# Patient Record
Sex: MALE | Race: WHITE | NOT HISPANIC OR LATINO | Employment: OTHER | URBAN - METROPOLITAN AREA
[De-identification: names, ages, dates, MRNs, and addresses within clinical notes are randomized per-mention and may not be internally consistent; named-entity substitution may affect disease eponyms.]

---

## 2018-12-31 ENCOUNTER — OFFICE VISIT (OUTPATIENT)
Dept: FAMILY MEDICINE CLINIC | Facility: CLINIC | Age: 58
End: 2018-12-31
Payer: COMMERCIAL

## 2018-12-31 VITALS
TEMPERATURE: 97.6 F | HEIGHT: 72 IN | DIASTOLIC BLOOD PRESSURE: 90 MMHG | HEART RATE: 72 BPM | OXYGEN SATURATION: 94 % | WEIGHT: 202.75 LBS | SYSTOLIC BLOOD PRESSURE: 142 MMHG | BODY MASS INDEX: 27.46 KG/M2

## 2018-12-31 DIAGNOSIS — E78.2 HYPERLIPIDEMIA, MIXED: ICD-10-CM

## 2018-12-31 DIAGNOSIS — Z23 NEED FOR TDAP VACCINATION: ICD-10-CM

## 2018-12-31 DIAGNOSIS — I10 ESSENTIAL HYPERTENSION: Primary | ICD-10-CM

## 2018-12-31 DIAGNOSIS — Z23 NEED FOR INFLUENZA VACCINATION: ICD-10-CM

## 2018-12-31 PROCEDURE — 99241 PR OFFICE CONSULTATION NEW/ESTAB PATIENT 15 MIN: CPT | Performed by: FAMILY MEDICINE

## 2018-12-31 RX ORDER — AMLODIPINE BESYLATE 10 MG/1
10 TABLET ORAL DAILY
Refills: 3 | COMMUNITY
Start: 2018-12-14

## 2018-12-31 RX ORDER — OMEGA-3-ACID ETHYL ESTERS 1 G/1
2 CAPSULE, LIQUID FILLED ORAL 2 TIMES DAILY
COMMUNITY
End: 2019-06-26 | Stop reason: CLARIF

## 2018-12-31 RX ORDER — CURCUMIN 100 %
POWDER (GRAM) MISCELLANEOUS
COMMUNITY

## 2018-12-31 NOTE — ASSESSMENT & PLAN NOTE
We discussed that his LDL goal is less than 130  It is certainly helpful that his HDL is high  He also asked about testing for particle size of LDL  This is definitely something we can do if he would like to-after discussion we decided we will do this will recheck his cholesterol next year  I also encouraged that he discuss this all with his cardiologist when he sees him in May

## 2018-12-31 NOTE — PROGRESS NOTES
Subjective:   Chief Complaint   Patient presents with    New Patient     pt is here today to become a new patient and establish care  Patient ID: Estela Ritter is a 62 y o  male  The pt is here to establish care   He has HTN  He is on amlodipine without any side effects  His hypertension has been very well controlled  120s/80s at home  130s-140s/90s in the doctors office  Sees cardiology - Dr Laura Quiros at [facility] annually    He was diagnosed with high cholesterol over the past couple of years  He has been taking plant sterols to try and bring it down  He is adamant that he would like to avoid statins  He has high HDL and high LDL  , HDL 67  The year before the LDL was 130  He thinks his mom has high cholesterol, his dad does not    He has a very healthy diet, lots of fish and chicken  Really fastidious about diet and exercise    No cp/sob/headaches        The following portions of the patient's history were reviewed and updated as appropriate: allergies, current medications, past family history, past medical history, past social history, past surgical history and problem list     Review of Systems   Constitutional: Negative for unexpected weight change  Eyes: Negative for visual disturbance  Respiratory: Negative for shortness of breath  Cardiovascular: Negative for chest pain  Gastrointestinal: Negative for abdominal pain  Genitourinary: Negative for difficulty urinating  Musculoskeletal: Negative for gait problem  Skin: Negative for rash  Allergic/Immunologic: Negative for immunocompromised state  Neurological: Negative for headaches  Objective:  Vitals:    12/31/18 1208   BP: 142/90   Pulse: 72   Temp: 97 6 °F (36 4 °C)   SpO2: 94%   Weight: 92 kg (202 lb 12 oz)   Height: 6' (1 829 m)      Physical Exam   Constitutional: He is oriented to person, place, and time  He appears well-developed and well-nourished  HENT:   Head: Normocephalic and atraumatic  Right Ear: External ear normal    Left Ear: External ear normal    Nose: Nose normal    Mouth/Throat: Oropharynx is clear and moist    Eyes: Pupils are equal, round, and reactive to light  Conjunctivae and EOM are normal    Neck: Normal range of motion  Neck supple  Cardiovascular: Normal rate, regular rhythm and normal heart sounds  Pulmonary/Chest: Effort normal and breath sounds normal    Musculoskeletal: Normal range of motion  He exhibits no edema  Neurological: He is alert and oriented to person, place, and time  Skin: Skin is warm, dry and intact  Psychiatric: He has a normal mood and affect  His behavior is normal  Judgment and thought content normal    Nursing note and vitals reviewed  Assessment/Plan:    Essential hypertension  Well controlled, continue amlodipine, continue routine follow-up with the cardiologist     Hyperlipidemia, mixed  We discussed that his LDL goal is less than 130  It is certainly helpful that his HDL is high  He also asked about testing for particle size of LDL  This is definitely something we can do if he would like to-after discussion we decided we will do this will recheck his cholesterol next year  I also encouraged that he discuss this all with his cardiologist when he sees him in May         Diagnoses and all orders for this visit:    Essential hypertension    Hyperlipidemia, mixed

## 2019-05-02 ENCOUNTER — TELEPHONE (OUTPATIENT)
Dept: FAMILY MEDICINE CLINIC | Facility: CLINIC | Age: 59
End: 2019-05-02

## 2019-05-02 DIAGNOSIS — E78.2 HYPERLIPIDEMIA, MIXED: ICD-10-CM

## 2019-05-02 DIAGNOSIS — Z11.59 ENCOUNTER FOR HEPATITIS C SCREENING TEST FOR LOW RISK PATIENT: ICD-10-CM

## 2019-05-02 DIAGNOSIS — I10 ESSENTIAL HYPERTENSION: Primary | ICD-10-CM

## 2019-05-02 DIAGNOSIS — Z12.5 SCREENING PSA (PROSTATE SPECIFIC ANTIGEN): ICD-10-CM

## 2019-06-20 ENCOUNTER — CLINICAL SUPPORT (OUTPATIENT)
Dept: FAMILY MEDICINE CLINIC | Facility: CLINIC | Age: 59
End: 2019-06-20
Payer: COMMERCIAL

## 2019-06-20 DIAGNOSIS — I10 ESSENTIAL HYPERTENSION: ICD-10-CM

## 2019-06-20 DIAGNOSIS — Z11.59 ENCOUNTER FOR HEPATITIS C SCREENING TEST FOR LOW RISK PATIENT: ICD-10-CM

## 2019-06-20 DIAGNOSIS — Z12.5 SCREENING PSA (PROSTATE SPECIFIC ANTIGEN): ICD-10-CM

## 2019-06-20 DIAGNOSIS — E78.2 HYPERLIPIDEMIA, MIXED: Primary | ICD-10-CM

## 2019-06-20 PROCEDURE — 36415 COLL VENOUS BLD VENIPUNCTURE: CPT

## 2019-06-21 LAB
ALBUMIN SERPL-MCNC: 4.4 G/DL (ref 3.5–5.5)
ALBUMIN/GLOB SERPL: 2.1 {RATIO} (ref 1.2–2.2)
ALP SERPL-CCNC: 66 IU/L (ref 39–117)
ALT SERPL-CCNC: 23 IU/L (ref 0–44)
AST SERPL-CCNC: 25 IU/L (ref 0–40)
BASOPHILS # BLD AUTO: 0 X10E3/UL (ref 0–0.2)
BASOPHILS NFR BLD AUTO: 1 %
BILIRUB SERPL-MCNC: 0.6 MG/DL (ref 0–1.2)
BUN SERPL-MCNC: 16 MG/DL (ref 6–24)
BUN/CREAT SERPL: 19 (ref 9–20)
CALCIUM SERPL-MCNC: 9.4 MG/DL (ref 8.7–10.2)
CHLORIDE SERPL-SCNC: 103 MMOL/L (ref 96–106)
CHOLEST SERPL-MCNC: 220 MG/DL (ref 100–199)
CO2 SERPL-SCNC: 28 MMOL/L (ref 20–29)
CREAT SERPL-MCNC: 0.83 MG/DL (ref 0.76–1.27)
EOSINOPHIL # BLD AUTO: 0.2 X10E3/UL (ref 0–0.4)
EOSINOPHIL NFR BLD AUTO: 4 %
ERYTHROCYTE [DISTWIDTH] IN BLOOD BY AUTOMATED COUNT: 13.5 % (ref 12.3–15.4)
GLOBULIN SER-MCNC: 2.1 G/DL (ref 1.5–4.5)
GLUCOSE SERPL-MCNC: 93 MG/DL (ref 65–99)
HCT VFR BLD AUTO: 47.1 % (ref 37.5–51)
HCV AB S/CO SERPL IA: <0.1 S/CO RATIO (ref 0–0.9)
HDLC SERPL-MCNC: 64 MG/DL
HGB BLD-MCNC: 16.2 G/DL (ref 13–17.7)
IMM GRANULOCYTES # BLD: 0 X10E3/UL (ref 0–0.1)
IMM GRANULOCYTES NFR BLD: 0 %
LDLC SERPL CALC-MCNC: 141 MG/DL (ref 0–99)
LDLC/HDLC SERPL: 2.2 RATIO (ref 0–3.6)
LYMPHOCYTES # BLD AUTO: 1.8 X10E3/UL (ref 0.7–3.1)
LYMPHOCYTES NFR BLD AUTO: 32 %
MCH RBC QN AUTO: 30.9 PG (ref 26.6–33)
MCHC RBC AUTO-ENTMCNC: 34.4 G/DL (ref 31.5–35.7)
MCV RBC AUTO: 90 FL (ref 79–97)
MONOCYTES # BLD AUTO: 0.5 X10E3/UL (ref 0.1–0.9)
MONOCYTES NFR BLD AUTO: 9 %
NEUTROPHILS # BLD AUTO: 3.2 X10E3/UL (ref 1.4–7)
NEUTROPHILS NFR BLD AUTO: 54 %
PLATELET # BLD AUTO: 191 X10E3/UL (ref 150–450)
POTASSIUM SERPL-SCNC: 4.6 MMOL/L (ref 3.5–5.2)
PROT SERPL-MCNC: 6.5 G/DL (ref 6–8.5)
PSA SERPL-MCNC: 0.7 NG/ML (ref 0–4)
RBC # BLD AUTO: 5.24 X10E6/UL (ref 4.14–5.8)
SL AMB EGFR AFRICAN AMERICAN: 112 ML/MIN/1.73
SL AMB EGFR NON AFRICAN AMERICAN: 97 ML/MIN/1.73
SL AMB REFLEX CRITERIA: NORMAL
SL AMB VLDL CHOLESTEROL CALC: 15 MG/DL (ref 5–40)
SODIUM SERPL-SCNC: 143 MMOL/L (ref 134–144)
TRIGL SERPL-MCNC: 76 MG/DL (ref 0–149)
TSH SERPL DL<=0.005 MIU/L-ACNC: 3.01 UIU/ML (ref 0.45–4.5)
WBC # BLD AUTO: 5.7 X10E3/UL (ref 3.4–10.8)

## 2019-06-26 ENCOUNTER — OFFICE VISIT (OUTPATIENT)
Dept: FAMILY MEDICINE CLINIC | Facility: CLINIC | Age: 59
End: 2019-06-26
Payer: COMMERCIAL

## 2019-06-26 VITALS
WEIGHT: 193 LBS | HEIGHT: 72 IN | DIASTOLIC BLOOD PRESSURE: 82 MMHG | HEART RATE: 61 BPM | OXYGEN SATURATION: 98 % | SYSTOLIC BLOOD PRESSURE: 134 MMHG | BODY MASS INDEX: 26.14 KG/M2 | TEMPERATURE: 97.2 F

## 2019-06-26 DIAGNOSIS — E78.2 HYPERLIPIDEMIA, MIXED: ICD-10-CM

## 2019-06-26 DIAGNOSIS — Z00.00 ANNUAL PHYSICAL EXAM: Primary | ICD-10-CM

## 2019-06-26 DIAGNOSIS — I10 ESSENTIAL HYPERTENSION: ICD-10-CM

## 2019-06-26 PROCEDURE — 99396 PREV VISIT EST AGE 40-64: CPT | Performed by: FAMILY MEDICINE

## 2019-09-03 ENCOUNTER — OFFICE VISIT (OUTPATIENT)
Dept: FAMILY MEDICINE CLINIC | Facility: CLINIC | Age: 59
End: 2019-09-03
Payer: COMMERCIAL

## 2019-09-03 VITALS
OXYGEN SATURATION: 98 % | SYSTOLIC BLOOD PRESSURE: 138 MMHG | HEIGHT: 72 IN | HEART RATE: 66 BPM | TEMPERATURE: 98.3 F | WEIGHT: 199 LBS | DIASTOLIC BLOOD PRESSURE: 98 MMHG | BODY MASS INDEX: 26.95 KG/M2

## 2019-09-03 DIAGNOSIS — R07.89 DISCOMFORT IN CHEST: Primary | ICD-10-CM

## 2019-09-03 DIAGNOSIS — K21.9 GASTROESOPHAGEAL REFLUX DISEASE WITHOUT ESOPHAGITIS: ICD-10-CM

## 2019-09-03 PROCEDURE — 3008F BODY MASS INDEX DOCD: CPT | Performed by: FAMILY MEDICINE

## 2019-09-03 PROCEDURE — 99214 OFFICE O/P EST MOD 30 MIN: CPT | Performed by: FAMILY MEDICINE

## 2019-09-03 RX ORDER — RANITIDINE 150 MG/1
150 TABLET ORAL
Qty: 30 TABLET | Refills: 0 | Status: SHIPPED | OUTPATIENT
Start: 2019-09-03 | End: 2022-02-23

## 2019-09-03 NOTE — PATIENT INSTRUCTIONS
Gastroesophageal Reflux Disease   WHAT YOU NEED TO KNOW:   Gastroesophageal reflux occurs when acid and food in the stomach back up into the esophagus  Gastroesophageal reflux disease (GERD) is reflux that occurs more than twice a week for a few weeks  It usually causes heartburn and other symptoms  GERD can cause other health problems over time if it is not treated  DISCHARGE INSTRUCTIONS:   Return to the emergency department if:   · You feel full and cannot burp or vomit  · You have severe chest pain and sudden trouble breathing  · Your bowel movements are black, bloody, or tarry-looking  · Your vomit looks like coffee grounds or has blood in it  Contact your healthcare provider if:   · You vomit large amounts, or you vomit often  · You have trouble breathing after you vomit  · You have trouble swallowing, or pain with swallowing  · You are losing weight without trying  · Your symptoms get worse or do not improve with treatment  · You have questions or concerns about your condition or care  Medicines:   · Medicines  are used to decrease stomach acid  Medicine may also be used to help your lower esophageal sphincter and stomach contract (tighten) more  · Take your medicine as directed  Contact your healthcare provider if you think your medicine is not helping or if you have side effects  Tell him of her if you are allergic to any medicine  Keep a list of the medicines, vitamins, and herbs you take  Include the amounts, and when and why you take them  Bring the list or the pill bottles to follow-up visits  Carry your medicine list with you in case of an emergency  Manage GERD:   · Do not have foods or drinks that may increase heartburn  These include chocolate, peppermint, fried or fatty foods, drinks that contain caffeine, or carbonated drinks (soda)  Other foods include spicy foods, onions, tomatoes, and tomato-based foods   Do not have foods or drinks that can irritate your esophagus, such as citrus fruits, juices, and alcohol  · Do not eat large meals  When you eat a lot of food at one time, your stomach needs more acid to digest it  Eat 6 small meals each day instead of 3 large ones, and eat slowly  Do not eat meals 2 to 3 hours before bedtime  · Elevate the head of your bed  Place 6-inch blocks under the head of your bed frame  You may also use more than one pillow under your head and shoulders while you sleep  · Maintain a healthy weight  If you are overweight, weight loss may help relieve symptoms of GERD  · Do not smoke  Smoking weakens the lower esophageal sphincter and increases the risk of GERD  Ask your healthcare provider for information if you currently smoke and need help to quit  E-cigarettes or smokeless tobacco still contain nicotine  Talk to your healthcare provider before you use these products  · Do not wear clothing that is tight around your waist   Tight clothing can put pressure on your stomach and cause or worsen GERD symptoms  Follow up with your healthcare provider as directed:  Write down your questions so you remember to ask them during your visits  © 2017 [facility] Information is for End User's use only and may not be sold, redistributed or otherwise used for commercial purposes  All illustrations and images included in CareNotes® are the copyrighted property of [employer]  or Adriel Quiroz  The above information is an  only  It is not intended as medical advice for individual conditions or treatments  Talk to your doctor, nurse or pharmacist before following any medical regimen to see if it is safe and effective for you

## 2019-09-03 NOTE — PROGRESS NOTES
Subjective:   Chief Complaint   Patient presents with   Choco Serraon     pt comes in today for episodes of weakness and tingling in the middle of the night   pt states it wakes him from sleep starting at the top of his head down to his toes  pt has had 3 episodes, which only the first did he vomit  pt does say he has gerd sx's  pt also has hx of lumbar herniations  stress test in july negative  Patient ID: Marilu Valdez is a 62 y o  male  First episode was early July   He woke up and had to get out of bed  He walked around, had a normal BM, then he vomited  "Weak, tingling feeling" top of head all the way down to his toes  This has happened 2 other times - mid August, and last Friday night  He did not vomit the next 2 times  Happens around 1-2 am - wakes him from sleep  Starts with a reflux type pain in the center of his chest  He checks his BP at these times and it is high but he thinks it is because of the feeling he is having this episode - 160/100  He just had a completely normal stress test at the end of July   Went back to bed each time  The next day he felt completely fine like nothing ever happened  2/3 episodes after pizza on a Friday night               The following portions of the patient's history were reviewed and updated as appropriate: allergies, current medications, past family history, past medical history, past social history, past surgical history and problem list     Review of Systems      Objective:  Vitals:    09/03/19 1618   BP: 138/98   Pulse: 66   Temp: 98 3 °F (36 8 °C)   SpO2: 98%   Weight: 90 3 kg (199 lb)   Height: 6' (1 829 m)      Physical Exam   Constitutional: He is oriented to person, place, and time  He appears well-developed and well-nourished  No distress  Neurological: He is alert and oriented to person, place, and time  No cranial nerve deficit  Coordination normal    Skin: Skin is warm and dry  No rash noted  He is not diaphoretic  No erythema  Psychiatric: He has a normal mood and affect  His behavior is normal  Judgment and thought content normal          Assessment/Plan:    No problem-specific Assessment & Plan notes found for this encounter  Diagnoses and all orders for this visit:    Discomfort in chest    Gastroesophageal reflux disease without esophagitis  -     ranitidine (ZANTAC) 150 mg tablet; Take 1 tablet (150 mg total) by mouth daily at bedtime        We reviewed the patient's recent labs as well as his completely normal stress test at the end of July  I am relatively comfortable that this is not cardiac in origin and am feeling more certain that this is related to reflux  I am going to try him on ranitidine nightly to see if this prevents these exacerbations  If he is going to eat something that would exacerbate reflux, and we did discuss these foods, he can take a Zantac at a time to try and prevent that  If he is still getting exacerbations through the Zantac that I would have him see his gastroenterologist in [facility] to consider upper endoscopy  More than half of this 25 minute visit spent counseling and coordinating care

## 2019-10-02 ENCOUNTER — TELEPHONE (OUTPATIENT)
Dept: FAMILY MEDICINE CLINIC | Facility: CLINIC | Age: 59
End: 2019-10-02

## 2019-10-02 NOTE — TELEPHONE ENCOUNTER
Please call the patient and see if he has checked his blood pressure outside of the office  When he was here a month ago his blood pressure was elevated  He also was not feeling well  Please see if he is feeling better and also see if his blood pressure has come down  If he is not sure please schedule him a nurse visit to have his blood pressure checked

## 2019-10-08 ENCOUNTER — DOCUMENTATION (OUTPATIENT)
Dept: FAMILY MEDICINE CLINIC | Facility: CLINIC | Age: 59
End: 2019-10-08

## 2019-10-08 VITALS — SYSTOLIC BLOOD PRESSURE: 128 MMHG | DIASTOLIC BLOOD PRESSURE: 82 MMHG

## 2020-07-27 ENCOUNTER — TELEPHONE (OUTPATIENT)
Dept: FAMILY MEDICINE CLINIC | Facility: CLINIC | Age: 60
End: 2020-07-27

## 2020-07-30 DIAGNOSIS — Z13.21 SCREENING FOR ENDOCRINE, NUTRITIONAL, METABOLIC AND IMMUNITY DISORDER: ICD-10-CM

## 2020-07-30 DIAGNOSIS — Z13.228 SCREENING FOR ENDOCRINE, NUTRITIONAL, METABOLIC AND IMMUNITY DISORDER: ICD-10-CM

## 2020-07-30 DIAGNOSIS — E78.2 HYPERLIPIDEMIA, MIXED: Primary | ICD-10-CM

## 2020-07-30 DIAGNOSIS — Z13.0 SCREENING FOR IRON DEFICIENCY ANEMIA: ICD-10-CM

## 2020-07-30 DIAGNOSIS — Z12.5 SCREENING FOR MALIGNANT NEOPLASM OF PROSTATE: ICD-10-CM

## 2020-07-30 DIAGNOSIS — Z13.29 SCREENING FOR ENDOCRINE, NUTRITIONAL, METABOLIC AND IMMUNITY DISORDER: ICD-10-CM

## 2020-07-30 DIAGNOSIS — Z13.0 SCREENING FOR ENDOCRINE, NUTRITIONAL, METABOLIC AND IMMUNITY DISORDER: ICD-10-CM

## 2020-07-30 DIAGNOSIS — Z13.29 SCREENING FOR THYROID DISORDER: ICD-10-CM

## 2020-08-06 LAB
ALBUMIN SERPL-MCNC: 4.6 G/DL (ref 3.8–4.9)
ALBUMIN/GLOB SERPL: 2 {RATIO} (ref 1.2–2.2)
ALP SERPL-CCNC: 74 IU/L (ref 39–117)
ALT SERPL-CCNC: 27 IU/L (ref 0–44)
AST SERPL-CCNC: 34 IU/L (ref 0–40)
BASOPHILS # BLD AUTO: 0.1 X10E3/UL (ref 0–0.2)
BASOPHILS NFR BLD AUTO: 1 %
BILIRUB SERPL-MCNC: 0.7 MG/DL (ref 0–1.2)
BUN SERPL-MCNC: 20 MG/DL (ref 6–24)
BUN/CREAT SERPL: 20 (ref 9–20)
CALCIUM SERPL-MCNC: 9.8 MG/DL (ref 8.7–10.2)
CHLORIDE SERPL-SCNC: 102 MMOL/L (ref 96–106)
CHOLEST SERPL-MCNC: 216 MG/DL (ref 100–199)
CO2 SERPL-SCNC: 26 MMOL/L (ref 20–29)
CREAT SERPL-MCNC: 1.01 MG/DL (ref 0.76–1.27)
EOSINOPHIL # BLD AUTO: 0.2 X10E3/UL (ref 0–0.4)
EOSINOPHIL NFR BLD AUTO: 4 %
ERYTHROCYTE [DISTWIDTH] IN BLOOD BY AUTOMATED COUNT: 12.6 % (ref 11.6–15.4)
GLOBULIN SER-MCNC: 2.3 G/DL (ref 1.5–4.5)
GLUCOSE SERPL-MCNC: 94 MG/DL (ref 65–99)
HCT VFR BLD AUTO: 48.3 % (ref 37.5–51)
HDLC SERPL-MCNC: 71 MG/DL
HGB BLD-MCNC: 16.1 G/DL (ref 13–17.7)
IMM GRANULOCYTES # BLD: 0 X10E3/UL (ref 0–0.1)
IMM GRANULOCYTES NFR BLD: 0 %
LDLC SERPL CALC-MCNC: 131 MG/DL (ref 0–99)
LDLC/HDLC SERPL: 1.8 RATIO (ref 0–3.6)
LYMPHOCYTES # BLD AUTO: 1.7 X10E3/UL (ref 0.7–3.1)
LYMPHOCYTES NFR BLD AUTO: 32 %
MCH RBC QN AUTO: 29.4 PG (ref 26.6–33)
MCHC RBC AUTO-ENTMCNC: 33.3 G/DL (ref 31.5–35.7)
MCV RBC AUTO: 88 FL (ref 79–97)
MONOCYTES # BLD AUTO: 0.5 X10E3/UL (ref 0.1–0.9)
MONOCYTES NFR BLD AUTO: 9 %
NEUTROPHILS # BLD AUTO: 2.9 X10E3/UL (ref 1.4–7)
NEUTROPHILS NFR BLD AUTO: 54 %
PLATELET # BLD AUTO: 204 X10E3/UL (ref 150–450)
POTASSIUM SERPL-SCNC: 5.3 MMOL/L (ref 3.5–5.2)
PROT SERPL-MCNC: 6.9 G/DL (ref 6–8.5)
PSA SERPL-MCNC: 0.7 NG/ML (ref 0–4)
RBC # BLD AUTO: 5.47 X10E6/UL (ref 4.14–5.8)
SL AMB EGFR AFRICAN AMERICAN: 94 ML/MIN/1.73
SL AMB EGFR NON AFRICAN AMERICAN: 81 ML/MIN/1.73
SL AMB REFLEX CRITERIA: NORMAL
SL AMB VLDL CHOLESTEROL CALC: 14 MG/DL (ref 5–40)
SODIUM SERPL-SCNC: 139 MMOL/L (ref 134–144)
TRIGL SERPL-MCNC: 68 MG/DL (ref 0–149)
TSH SERPL DL<=0.005 MIU/L-ACNC: 3.2 UIU/ML (ref 0.45–4.5)
WBC # BLD AUTO: 5.3 X10E3/UL (ref 3.4–10.8)

## 2020-08-17 ENCOUNTER — OFFICE VISIT (OUTPATIENT)
Dept: FAMILY MEDICINE CLINIC | Facility: CLINIC | Age: 60
End: 2020-08-17
Payer: COMMERCIAL

## 2020-08-17 VITALS
TEMPERATURE: 98.4 F | OXYGEN SATURATION: 99 % | BODY MASS INDEX: 27.29 KG/M2 | SYSTOLIC BLOOD PRESSURE: 128 MMHG | HEIGHT: 72 IN | DIASTOLIC BLOOD PRESSURE: 80 MMHG | HEART RATE: 62 BPM | WEIGHT: 201.5 LBS

## 2020-08-17 DIAGNOSIS — Z00.00 ANNUAL PHYSICAL EXAM: Primary | ICD-10-CM

## 2020-08-17 DIAGNOSIS — Z23 NEED FOR VACCINATION: ICD-10-CM

## 2020-08-17 PROBLEM — I10 WHITE COAT SYNDROME WITH DIAGNOSIS OF HYPERTENSION: Status: ACTIVE | Noted: 2020-08-17

## 2020-08-17 PROCEDURE — 99396 PREV VISIT EST AGE 40-64: CPT | Performed by: FAMILY MEDICINE

## 2020-08-17 PROCEDURE — 3074F SYST BP LT 130 MM HG: CPT | Performed by: FAMILY MEDICINE

## 2020-08-17 PROCEDURE — 1036F TOBACCO NON-USER: CPT | Performed by: FAMILY MEDICINE

## 2020-08-17 PROCEDURE — 3008F BODY MASS INDEX DOCD: CPT | Performed by: FAMILY MEDICINE

## 2020-08-17 PROCEDURE — 3079F DIAST BP 80-89 MM HG: CPT | Performed by: FAMILY MEDICINE

## 2020-08-17 PROCEDURE — 3725F SCREEN DEPRESSION PERFORMED: CPT | Performed by: FAMILY MEDICINE

## 2020-08-17 PROCEDURE — 90715 TDAP VACCINE 7 YRS/> IM: CPT

## 2020-08-17 PROCEDURE — 90471 IMMUNIZATION ADMIN: CPT

## 2020-08-17 RX ORDER — ASPIRIN 81 MG/1
TABLET ORAL
COMMUNITY

## 2020-08-17 NOTE — PROGRESS NOTES
[facility]    NAME: Ela Carbone  AGE: 61 y o  SEX: male  : 1960     DATE: 2020     Assessment and Plan:     Problem List Items Addressed This Visit     None      Visit Diagnoses     Annual physical exam    -  Primary    Need for vaccination        Relevant Orders    TDAP VACCINE GREATER THAN OR EQUAL TO 6YO IM (Completed)          Immunizations and preventive care screenings were discussed with patient today  Appropriate education was printed on patient's after visit summary  Counseling:  Dental Health: discussed importance of regular tooth brushing, flossing, and dental visits  · Exercise: the importance of regular exercise/physical activity was discussed  Recommend exercise 3-5 times per week for at least 30 minutes  · Labs reviewed, generally within normal limits, LDL of 131 which is decreased from 141 last year  His ASCVD score was initially 9 5% with his initial systolic blood pressure of 140 but came down to 7 7% with my repeat blood pressure  At this point we decided together to avoid starting statins, his HDL is 71 which is excellent  Certainly bringing the LDL down would decrease his ASCVD risk score but he is extremely active in needs an extremely clean diet  We will follow the lipids carefully and if they do start to trend upward he would be amenable to starting a small dose of a statin if needed  BMI Counseling: Body mass index is 27 33 kg/m²  The BMI is above normal  Exercise recommendations include exercising 3-5 times per week  No follow-ups on file  Chief Complaint:     Chief Complaint   Patient presents with    Annual Exam     Pt here today for his annual exam  [address] 2020  History of Present Illness:     Adult Annual Physical   Patient here for a comprehensive physical exam  The patient reports no problems      Diet and Physical Activity  · Diet/Nutrition: well balanced diet    · Exercise: moderate cardiovascular exercise  Depression Screening  PHQ-9 Depression Screening    PHQ-9:    Frequency of the following problems over the past two weeks:       Little interest or pleasure in doing things:  0 - not at all  Feeling down, depressed, or hopeless:  0 - not at all  PHQ-2 Score:  0       General Health  · Sleep: sleeps well  · Hearing: normal - bilateral   · Vision: no vision problems and most recent eye exam <1 year ago  · Dental: regular dental visits  Review of Systems:     Review of Systems   Constitutional: Negative for chills, fever and unexpected weight change  HENT: Negative for congestion, ear pain, hearing loss, postnasal drip, rhinorrhea and sore throat  Eyes: Negative for visual disturbance  Respiratory: Negative for cough and shortness of breath  Cardiovascular: Negative for chest pain  Gastrointestinal: Negative for abdominal pain, constipation and diarrhea  Genitourinary: Negative for difficulty urinating  Musculoskeletal: Negative for arthralgias and gait problem  Skin: Negative for rash  Neurological: Negative for light-headedness and headaches  Psychiatric/Behavioral: Negative for dysphoric mood and sleep disturbance  The patient is not nervous/anxious         Past Medical History:     Past Medical History:   Diagnosis Date    HL (hearing loss)     rining of ears    Hypertension     Shingles       Past Surgical History:     Past Surgical History:   Procedure Laterality Date    VENTRAL HERNIA REPAIR  2001      Family History:     Family History   Problem Relation Age of Onset    Cancer Mother     Hypertension Father     Prostate cancer Father     Heart attack Father     Autism Son       Social History:        Social History     Socioeconomic History    Marital status: /Civil Union     Spouse name: None    Number of children: None    Years of education: None    Highest education level: None   Occupational History    None   Social Needs    Financial resource strain: None    Food insecurity     Worry: None     Inability: None    Transportation needs     Medical: None     Non-medical: None   Tobacco Use    Smoking status: Former Smoker    Smokeless tobacco: Former User   Substance and Sexual Activity    Alcohol use: Yes     Comment: 16 oz per week     Drug use: Yes     Comment: 30 years ago     Sexual activity: None   Lifestyle    Physical activity     Days per week: None     Minutes per session: None    Stress: None   Relationships    Social connections     Talks on phone: None     Gets together: None     Attends [employer] service: None     Active member of club or organization: None     Attends meetings of clubs or organizations: None     Relationship status: None    Intimate partner violence     Fear of current or ex partner: None     Emotionally abused: None     Physically abused: None     Forced sexual activity: None   Other Topics Concern    None   Social History Narrative    None      Current Medications:     Current Outpatient Medications   Medication Sig Dispense Refill    amLODIPine (NORVASC) 10 mg tablet Take 10 mg by mouth daily  3    aspirin (Aspir-Low) 81 mg EC tablet take 1 tablet by oral route  every day      Cholecalciferol (VITAMIN D PO) Take by mouth      co-enzyme Q-10 30 MG capsule Take 30 mg by mouth 3 (three) times a day      Omega-3 Fatty Acids (FISH OIL PO) Take by mouth      Turmeric, Curcuma Longa, (CURCUMIN) POWD by Does not apply route      Ascorbic Acid (VITAMIN C PO) Take by mouth      Plant Sterols and Stanols (CHOLESTOFF) 450 MG TABS Take by mouth      Probiotic Product (PROBIOTIC PO) Take by mouth      ranitidine (ZANTAC) 150 mg tablet Take 1 tablet (150 mg total) by mouth daily at bedtime (Patient not taking: Reported on 8/17/2020) 30 tablet 0     No current facility-administered medications for this visit         Allergies:     No Known Allergies   Physical Exam:     /80   Pulse 62   Temp 98 4 °F (36 9 °C) (Tympanic)   Ht 6' (1 829 m)   Wt 91 4 kg (201 lb 8 oz)   SpO2 99%   BMI 27 33 kg/m²     Physical Exam  Constitutional:       Appearance: He is well-developed  HENT:      Head: Normocephalic and atraumatic  Right Ear: Hearing, tympanic membrane, ear canal and external ear normal       Left Ear: Hearing, tympanic membrane, ear canal and external ear normal    Eyes:      General: Lids are normal       Conjunctiva/sclera: Conjunctivae normal       Pupils: Pupils are equal, round, and reactive to light  Neck:      Musculoskeletal: Full passive range of motion without pain, normal range of motion and neck supple  Cardiovascular:      Rate and Rhythm: Normal rate and regular rhythm  Heart sounds: Normal heart sounds  No murmur  Pulmonary:      Effort: Pulmonary effort is normal  No respiratory distress  Breath sounds: Normal breath sounds  No wheezing  Abdominal:      General: Bowel sounds are normal  There is no distension  Palpations: Abdomen is soft  Tenderness: There is no abdominal tenderness  Musculoskeletal: Normal range of motion  General: No deformity  Skin:     General: Skin is warm and dry  Findings: No rash  Neurological:      Mental Status: He is alert and oriented to person, place, and time  Cranial Nerves: No cranial nerve deficit  Psychiatric:         Mood and Affect: Mood normal          Behavior: Behavior normal          Thought Content:  Thought content normal          Judgment: Judgment normal           MD Иван Alcala

## 2020-08-17 NOTE — PATIENT INSTRUCTIONS

## 2020-10-02 ENCOUNTER — TELEPHONE (OUTPATIENT)
Dept: FAMILY MEDICINE CLINIC | Facility: CLINIC | Age: 60
End: 2020-10-02

## 2021-01-14 ENCOUNTER — TRANSCRIBE ORDERS (OUTPATIENT)
Dept: ADMINISTRATIVE | Facility: HOSPITAL | Age: 61
End: 2021-01-14

## 2021-01-14 DIAGNOSIS — M54.14 THORACIC RADICULOPATHY: Primary | ICD-10-CM

## 2021-01-14 DIAGNOSIS — M54.6 RIGHT-SIDED THORACIC BACK PAIN, UNSPECIFIED CHRONICITY: ICD-10-CM

## 2021-01-29 ENCOUNTER — HOSPITAL ENCOUNTER (OUTPATIENT)
Dept: MRI IMAGING | Facility: HOSPITAL | Age: 61
Discharge: HOME | End: 2021-01-29
Payer: COMMERCIAL

## 2021-01-29 DIAGNOSIS — M54.14 THORACIC RADICULOPATHY: ICD-10-CM

## 2021-01-29 DIAGNOSIS — M54.6 RIGHT-SIDED THORACIC BACK PAIN, UNSPECIFIED CHRONICITY: ICD-10-CM

## 2021-01-29 PROCEDURE — G1004 CDSM NDSC: HCPCS

## 2021-01-29 PROCEDURE — 72146 MRI CHEST SPINE W/O DYE: CPT

## 2021-07-21 DIAGNOSIS — Z13.228 SCREENING FOR ENDOCRINE, NUTRITIONAL, METABOLIC AND IMMUNITY DISORDER: ICD-10-CM

## 2021-07-21 DIAGNOSIS — Z13.0 SCREENING FOR IRON DEFICIENCY ANEMIA: ICD-10-CM

## 2021-07-21 DIAGNOSIS — Z13.29 SCREENING FOR ENDOCRINE, NUTRITIONAL, METABOLIC AND IMMUNITY DISORDER: ICD-10-CM

## 2021-07-21 DIAGNOSIS — Z13.21 SCREENING FOR ENDOCRINE, NUTRITIONAL, METABOLIC AND IMMUNITY DISORDER: ICD-10-CM

## 2021-07-21 DIAGNOSIS — Z13.0 SCREENING FOR ENDOCRINE, NUTRITIONAL, METABOLIC AND IMMUNITY DISORDER: ICD-10-CM

## 2021-07-21 DIAGNOSIS — Z13.29 SCREENING FOR THYROID DISORDER: ICD-10-CM

## 2021-07-21 DIAGNOSIS — Z12.5 SCREENING FOR MALIGNANT NEOPLASM OF PROSTATE: ICD-10-CM

## 2021-07-21 DIAGNOSIS — E78.2 HYPERLIPIDEMIA, MIXED: Primary | ICD-10-CM

## 2021-07-26 ENCOUNTER — OFFICE VISIT (OUTPATIENT)
Dept: FAMILY MEDICINE CLINIC | Facility: CLINIC | Age: 61
End: 2021-07-26
Payer: COMMERCIAL

## 2021-07-26 VITALS
HEART RATE: 69 BPM | OXYGEN SATURATION: 100 % | TEMPERATURE: 98.6 F | WEIGHT: 201.5 LBS | BODY MASS INDEX: 27.29 KG/M2 | SYSTOLIC BLOOD PRESSURE: 122 MMHG | HEIGHT: 72 IN | RESPIRATION RATE: 17 BRPM | DIASTOLIC BLOOD PRESSURE: 92 MMHG

## 2021-07-26 DIAGNOSIS — R35.0 URINARY FREQUENCY: Primary | ICD-10-CM

## 2021-07-26 DIAGNOSIS — N41.0 ACUTE PROSTATITIS: ICD-10-CM

## 2021-07-26 LAB
SL AMB  POCT GLUCOSE, UA: NEGATIVE
SL AMB LEUKOCYTE ESTERASE,UA: NEGATIVE
SL AMB POCT BILIRUBIN,UA: NEGATIVE
SL AMB POCT BLOOD,UA: NEGATIVE
SL AMB POCT CLARITY,UA: CLEAR
SL AMB POCT COLOR,UA: YELLOW
SL AMB POCT KETONES,UA: NEGATIVE
SL AMB POCT NITRITE,UA: NEGATIVE
SL AMB POCT PH,UA: 5
SL AMB POCT SPECIFIC GRAVITY,UA: 1
SL AMB POCT URINE PROTEIN: NEGATIVE
SL AMB POCT UROBILINOGEN: 0.2

## 2021-07-26 PROCEDURE — 1036F TOBACCO NON-USER: CPT | Performed by: FAMILY MEDICINE

## 2021-07-26 PROCEDURE — 99213 OFFICE O/P EST LOW 20 MIN: CPT | Performed by: FAMILY MEDICINE

## 2021-07-26 PROCEDURE — 3008F BODY MASS INDEX DOCD: CPT | Performed by: FAMILY MEDICINE

## 2021-07-26 PROCEDURE — 81002 URINALYSIS NONAUTO W/O SCOPE: CPT | Performed by: FAMILY MEDICINE

## 2021-07-26 PROCEDURE — 3725F SCREEN DEPRESSION PERFORMED: CPT | Performed by: FAMILY MEDICINE

## 2021-07-26 RX ORDER — MAGNESIUM 30 MG
30 TABLET ORAL 2 TIMES DAILY
COMMUNITY

## 2021-07-26 RX ORDER — CIPROFLOXACIN 500 MG/1
500 TABLET, FILM COATED ORAL EVERY 12 HOURS SCHEDULED
Qty: 28 TABLET | Refills: 0 | Status: SHIPPED | OUTPATIENT
Start: 2021-07-26 | End: 2021-08-09

## 2021-07-26 NOTE — PROGRESS NOTES
Subjective:   Chief Complaint   Patient presents with    Urinary Frequency        Patient ID: Mahad Snyder is a 61 y o  male  The pt is here today with c/o   Changes with his urination  He is thinking this could be something muscular  Does a lot of weight lifting  Elicits a huge reaction in his pelvic floor muscles  sometimes low back pain  Sometimes minor pelvic pain  But the urinary issues do have him concerned this could be something different  Last week he started having urinary flow issues  Not being able to urinate  Not his normal stream  More nocturia  He called his urologist - can't get in there for a month  As of Saturday things seemed to have gotten better  Slight sensation of discomfort after voiding       The following portions of the patient's history were reviewed and updated as appropriate: allergies, current medications, past family history, past medical history, past social history, past surgical history and problem list     Review of Systems          Objective:  Vitals:    07/26/21 1013   BP: 122/92   BP Location: Left arm   Patient Position: Sitting   Cuff Size: Large   Pulse: 69   Resp: 17   Temp: 98 6 °F (37 °C)   TempSrc: Tympanic   SpO2: 100%   Weight: 91 4 kg (201 lb 8 oz)   Height: 6' (1 829 m)      Physical Exam      Assessment/Plan:    No problem-specific Assessment & Plan notes found for this encounter  the patient has his labs scheduled for early August, the PSA will be done at that time, PSA was normal last year  He does have a follow-up appointment scheduled with Urology for months from now  Right now he is feeling generally well  We are going to send a urine culture though I suspect it will be negative  If his urinary symptoms return I would suspect this could be a prostatitis and I did prescribe Cipro  At this point he is not going to start the Cipro but he will started if he has a recurrence of the urinary symptoms         Diagnoses and all orders for this visit:    Urinary frequency  -     POCT urine dip  -     Urine culture; Future  -     Urine culture    Acute prostatitis  -     ciprofloxacin (CIPRO) 500 mg tablet; Take 1 tablet (500 mg total) by mouth every 12 (twelve) hours for 14 days  -     Urine culture; Future  -     Urine culture    Other orders  -     Red Yeast Rice Extract (RED YEAST RICE PO); Take by mouth  -     magnesium 30 MG tablet;  Take 30 mg by mouth 2 (two) times a day

## 2021-07-28 LAB
BACTERIA UR CULT: NORMAL
Lab: NO GROWTH

## 2021-07-30 ENCOUNTER — TELEPHONE (OUTPATIENT)
Dept: FAMILY MEDICINE CLINIC | Facility: CLINIC | Age: 61
End: 2021-07-30

## 2021-08-11 LAB
ALBUMIN SERPL-MCNC: 4.6 G/DL (ref 3.8–4.9)
ALBUMIN/GLOB SERPL: 2.1 {RATIO} (ref 1.2–2.2)
ALP SERPL-CCNC: 79 IU/L (ref 48–121)
ALT SERPL-CCNC: 24 IU/L (ref 0–44)
AST SERPL-CCNC: 31 IU/L (ref 0–40)
BASOPHILS # BLD AUTO: 0.1 X10E3/UL (ref 0–0.2)
BASOPHILS NFR BLD AUTO: 1 %
BILIRUB SERPL-MCNC: 0.6 MG/DL (ref 0–1.2)
BUN SERPL-MCNC: 13 MG/DL (ref 8–27)
BUN/CREAT SERPL: 15 (ref 10–24)
CALCIUM SERPL-MCNC: 9.7 MG/DL (ref 8.6–10.2)
CHLORIDE SERPL-SCNC: 103 MMOL/L (ref 96–106)
CHOLEST SERPL-MCNC: 225 MG/DL (ref 100–199)
CO2 SERPL-SCNC: 26 MMOL/L (ref 20–29)
CREAT SERPL-MCNC: 0.88 MG/DL (ref 0.76–1.27)
EOSINOPHIL # BLD AUTO: 0.2 X10E3/UL (ref 0–0.4)
EOSINOPHIL NFR BLD AUTO: 4 %
ERYTHROCYTE [DISTWIDTH] IN BLOOD BY AUTOMATED COUNT: 12.5 % (ref 11.6–15.4)
GLOBULIN SER-MCNC: 2.2 G/DL (ref 1.5–4.5)
GLUCOSE SERPL-MCNC: 91 MG/DL (ref 65–99)
HCT VFR BLD AUTO: 47 % (ref 37.5–51)
HDLC SERPL-MCNC: 64 MG/DL
HGB BLD-MCNC: 16.1 G/DL (ref 13–17.7)
IMM GRANULOCYTES # BLD: 0 X10E3/UL (ref 0–0.1)
IMM GRANULOCYTES NFR BLD: 0 %
LDLC SERPL CALC-MCNC: 148 MG/DL (ref 0–99)
LDLC/HDLC SERPL: 2.3 RATIO (ref 0–3.6)
LYMPHOCYTES # BLD AUTO: 1.5 X10E3/UL (ref 0.7–3.1)
LYMPHOCYTES NFR BLD AUTO: 28 %
MCH RBC QN AUTO: 30.2 PG (ref 26.6–33)
MCHC RBC AUTO-ENTMCNC: 34.3 G/DL (ref 31.5–35.7)
MCV RBC AUTO: 88 FL (ref 79–97)
MONOCYTES # BLD AUTO: 0.5 X10E3/UL (ref 0.1–0.9)
MONOCYTES NFR BLD AUTO: 9 %
NEUTROPHILS # BLD AUTO: 3.1 X10E3/UL (ref 1.4–7)
NEUTROPHILS NFR BLD AUTO: 58 %
PLATELET # BLD AUTO: 194 X10E3/UL (ref 150–450)
POTASSIUM SERPL-SCNC: 4.2 MMOL/L (ref 3.5–5.2)
PROT SERPL-MCNC: 6.8 G/DL (ref 6–8.5)
PSA FREE MFR SERPL: 28.8 %
PSA FREE SERPL-MCNC: 0.23 NG/ML
PSA SERPL-MCNC: 0.8 NG/ML (ref 0–4)
RBC # BLD AUTO: 5.33 X10E6/UL (ref 4.14–5.8)
SL AMB EGFR AFRICAN AMERICAN: 108 ML/MIN/1.73
SL AMB EGFR NON AFRICAN AMERICAN: 93 ML/MIN/1.73
SL AMB VLDL CHOLESTEROL CALC: 13 MG/DL (ref 5–40)
SODIUM SERPL-SCNC: 141 MMOL/L (ref 134–144)
TRIGL SERPL-MCNC: 72 MG/DL (ref 0–149)
TSH SERPL DL<=0.005 MIU/L-ACNC: 3.63 UIU/ML (ref 0.45–4.5)
WBC # BLD AUTO: 5.4 X10E3/UL (ref 3.4–10.8)

## 2021-08-12 ENCOUNTER — TELEPHONE (OUTPATIENT)
Dept: FAMILY MEDICINE CLINIC | Facility: CLINIC | Age: 61
End: 2021-08-12

## 2021-08-12 NOTE — TELEPHONE ENCOUNTER
Pt informed  He wanted to let you know that he couldn't eat any  than he already does  He also does not drink or smoke and he exercises regularly

## 2021-08-12 NOTE — TELEPHONE ENCOUNTER
----- Message from Kaci Guthrie MD sent at 8/12/2021  1:13 PM EDT -----    Please let the patient know his vitamin-D level is normal

## 2021-08-12 NOTE — TELEPHONE ENCOUNTER
----- Message from Chiquita Andrade MD sent at 8/12/2021  1:17 PM EDT -----    Please let the patient know his labs are normal except for an elevated LDL cholesterol level of 148  His LDL should be less than 100, at a minimum less than 130  It was 141 2 years ago and 131 last year  He should work on a low-fat, low-cholesterol diet  The LDL cholesterol puts him at a higher risk for heart attack and stroke  At some point it would be reasonable to get him on a medication to lower the cholesterol if he would like to do so  Otherwise we will recheck in a year

## 2022-02-23 ENCOUNTER — OFFICE VISIT (OUTPATIENT)
Dept: FAMILY MEDICINE CLINIC | Facility: CLINIC | Age: 62
End: 2022-02-23
Payer: COMMERCIAL

## 2022-02-23 VITALS
WEIGHT: 195 LBS | TEMPERATURE: 97.1 F | HEIGHT: 72 IN | SYSTOLIC BLOOD PRESSURE: 124 MMHG | BODY MASS INDEX: 26.41 KG/M2 | DIASTOLIC BLOOD PRESSURE: 72 MMHG | HEART RATE: 72 BPM | OXYGEN SATURATION: 98 %

## 2022-02-23 DIAGNOSIS — R10.30 LOWER ABDOMINAL PAIN: ICD-10-CM

## 2022-02-23 DIAGNOSIS — R10.31 INGUINAL PAIN OF BOTH SIDES: Primary | ICD-10-CM

## 2022-02-23 DIAGNOSIS — R10.32 INGUINAL PAIN OF BOTH SIDES: Primary | ICD-10-CM

## 2022-02-23 PROCEDURE — 3008F BODY MASS INDEX DOCD: CPT | Performed by: PHYSICIAN ASSISTANT

## 2022-02-23 PROCEDURE — 99213 OFFICE O/P EST LOW 20 MIN: CPT | Performed by: PHYSICIAN ASSISTANT

## 2022-02-23 NOTE — PROGRESS NOTES
Assessment/Plan:           Problem List Items Addressed This Visit     None      Visit Diagnoses     Inguinal pain of both sides    -  Primary    Relevant Orders    US abdominal wall    US abdomen complete    Lower abdominal pain        Relevant Orders    US abdominal wall    US abdomen complete            Subjective:      Patient ID: Nino Ritchie is a 64 y o  male  C/o abdominal pain  Had nausea one week, last summer  End of summer, August, had trouble urinating  Saw urologist; had prostate exam     Very active, and physical     Urinary issues have resolved  Doing a great deal of floor work at home, having low back pain, and lower abd  Pain, also around mid abd  Area  Gets routine colon exams, in [place]  Mother  of kidney cancer-  At age 61  Review of Systems   Constitutional: Negative for chills, diaphoresis, fatigue and fever  Respiratory: Negative for cough, chest tightness and shortness of breath  Gastrointestinal: Positive for abdominal pain  Negative for blood in stool, constipation, diarrhea, nausea and vomiting  Genitourinary: Negative for dysuria, frequency, hematuria, penile pain, penile swelling, scrotal swelling and testicular pain  Musculoskeletal: Positive for back pain and myalgias  Negative for arthralgias, neck pain and neck stiffness  Working out, and doing floor projects, use to be a   Objective:      /72   Pulse 72   Temp (!) 97 1 °F (36 2 °C) (Tympanic)   Ht 6' (1 829 m)   Wt 88 5 kg (195 lb)   SpO2 98%   BMI 26 45 kg/m²          Physical Exam  Constitutional:       General: He is not in acute distress  Appearance: Normal appearance  He is not ill-appearing, toxic-appearing or diaphoretic  HENT:      Head: Normocephalic and atraumatic  Cardiovascular:      Rate and Rhythm: Normal rate and regular rhythm  Pulses: Normal pulses  Heart sounds: Normal heart sounds     Pulmonary:      Effort: Pulmonary effort is normal  No respiratory distress  Breath sounds: Normal breath sounds  No stridor  No wheezing or rhonchi  Abdominal:      General: Abdomen is flat  Bowel sounds are normal  There is no distension  Palpations: Abdomen is soft  There is mass  Tenderness: There is abdominal tenderness  There is no right CVA tenderness, left CVA tenderness, guarding or rebound  Hernia: No hernia is present  Comments: Small lump noted around umbilical area (hx  Of ventral hernia repair), slight tenderness noted lower abd  Area, centrally  Musculoskeletal:         General: No swelling, tenderness, deformity or signs of injury  Normal range of motion  Right lower leg: No edema  Left lower leg: No edema  Neurological:      General: No focal deficit present  Mental Status: He is alert and oriented to person, place, and time

## 2022-03-01 ENCOUNTER — HOSPITAL ENCOUNTER (OUTPATIENT)
Dept: ULTRASOUND IMAGING | Facility: HOSPITAL | Age: 62
Discharge: HOME | End: 2022-03-01
Payer: COMMERCIAL

## 2022-03-01 DIAGNOSIS — R10.30 LOWER ABDOMINAL PAIN: ICD-10-CM

## 2022-03-01 DIAGNOSIS — R10.31 INGUINAL PAIN OF BOTH SIDES: ICD-10-CM

## 2022-03-01 DIAGNOSIS — R10.32 INGUINAL PAIN OF BOTH SIDES: ICD-10-CM

## 2022-03-01 PROCEDURE — 76700 US EXAM ABDOM COMPLETE: CPT

## 2022-03-07 ENCOUNTER — TELEPHONE (OUTPATIENT)
Dept: FAMILY MEDICINE CLINIC | Facility: CLINIC | Age: 62
End: 2022-03-07

## 2022-03-07 NOTE — TELEPHONE ENCOUNTER
----- Message from Victorino Graff PA-C sent at 3/7/2022 10:30 AM EST -----  Please inform patient all ultrasounds were normal

## 2022-07-19 DIAGNOSIS — I10 ESSENTIAL HYPERTENSION: ICD-10-CM

## 2022-07-19 DIAGNOSIS — R35.0 URINARY FREQUENCY: ICD-10-CM

## 2022-07-19 DIAGNOSIS — Z13.0 SCREENING FOR ENDOCRINE, NUTRITIONAL, METABOLIC AND IMMUNITY DISORDER: ICD-10-CM

## 2022-07-19 DIAGNOSIS — Z13.29 SCREENING FOR ENDOCRINE, NUTRITIONAL, METABOLIC AND IMMUNITY DISORDER: ICD-10-CM

## 2022-07-19 DIAGNOSIS — N41.0 ACUTE PROSTATITIS: Primary | ICD-10-CM

## 2022-07-19 DIAGNOSIS — Z13.0 SCREENING FOR IRON DEFICIENCY ANEMIA: ICD-10-CM

## 2022-07-19 DIAGNOSIS — Z13.21 SCREENING FOR ENDOCRINE, NUTRITIONAL, METABOLIC AND IMMUNITY DISORDER: ICD-10-CM

## 2022-07-19 DIAGNOSIS — Z13.228 SCREENING FOR ENDOCRINE, NUTRITIONAL, METABOLIC AND IMMUNITY DISORDER: ICD-10-CM

## 2022-07-19 DIAGNOSIS — E78.2 HYPERLIPIDEMIA, MIXED: ICD-10-CM

## 2022-07-19 DIAGNOSIS — Z13.220 SCREENING FOR LIPID DISORDERS: ICD-10-CM

## 2022-07-19 DIAGNOSIS — Z13.29 SCREENING FOR THYROID DISORDER: ICD-10-CM

## 2022-07-19 DIAGNOSIS — Z12.5 SCREENING FOR MALIGNANT NEOPLASM OF PROSTATE: ICD-10-CM

## 2022-08-20 LAB
25(OH)D3+25(OH)D2 SERPL-MCNC: 54.6 NG/ML (ref 30–100)
ALBUMIN SERPL-MCNC: 4.7 G/DL (ref 3.8–4.8)
ALBUMIN/GLOB SERPL: 2.1 {RATIO} (ref 1.2–2.2)
ALP SERPL-CCNC: 71 IU/L (ref 44–121)
ALT SERPL-CCNC: 24 IU/L (ref 0–44)
AST SERPL-CCNC: 33 IU/L (ref 0–40)
BASOPHILS # BLD AUTO: 0.1 X10E3/UL (ref 0–0.2)
BASOPHILS NFR BLD AUTO: 1 %
BILIRUB SERPL-MCNC: 0.6 MG/DL (ref 0–1.2)
BUN SERPL-MCNC: 12 MG/DL (ref 8–27)
BUN/CREAT SERPL: 13 (ref 10–24)
CALCIUM SERPL-MCNC: 9.8 MG/DL (ref 8.6–10.2)
CHLORIDE SERPL-SCNC: 103 MMOL/L (ref 96–106)
CHOLEST SERPL-MCNC: 267 MG/DL (ref 100–199)
CHOLEST/HDLC SERPL: 4.5 RATIO (ref 0–5)
CO2 SERPL-SCNC: 25 MMOL/L (ref 20–29)
CREAT SERPL-MCNC: 0.89 MG/DL (ref 0.76–1.27)
EGFR: 97 ML/MIN/1.73
EOSINOPHIL # BLD AUTO: 0.2 X10E3/UL (ref 0–0.4)
EOSINOPHIL NFR BLD AUTO: 5 %
ERYTHROCYTE [DISTWIDTH] IN BLOOD BY AUTOMATED COUNT: 12.8 % (ref 11.6–15.4)
GLOBULIN SER-MCNC: 2.2 G/DL (ref 1.5–4.5)
GLUCOSE SERPL-MCNC: 96 MG/DL (ref 65–99)
HCT VFR BLD AUTO: 47.7 % (ref 37.5–51)
HDLC SERPL-MCNC: 59 MG/DL
HGB BLD-MCNC: 16.8 G/DL (ref 13–17.7)
IMM GRANULOCYTES # BLD: 0 X10E3/UL (ref 0–0.1)
IMM GRANULOCYTES NFR BLD: 0 %
LDLC SERPL CALC-MCNC: 191 MG/DL (ref 0–99)
LYMPHOCYTES # BLD AUTO: 1.6 X10E3/UL (ref 0.7–3.1)
LYMPHOCYTES NFR BLD AUTO: 30 %
MCH RBC QN AUTO: 30.4 PG (ref 26.6–33)
MCHC RBC AUTO-ENTMCNC: 35.2 G/DL (ref 31.5–35.7)
MCV RBC AUTO: 86 FL (ref 79–97)
MONOCYTES # BLD AUTO: 0.4 X10E3/UL (ref 0.1–0.9)
MONOCYTES NFR BLD AUTO: 7 %
NEUTROPHILS # BLD AUTO: 3 X10E3/UL (ref 1.4–7)
NEUTROPHILS NFR BLD AUTO: 57 %
PLATELET # BLD AUTO: 197 X10E3/UL (ref 150–450)
POTASSIUM SERPL-SCNC: 4.7 MMOL/L (ref 3.5–5.2)
PROT SERPL-MCNC: 6.9 G/DL (ref 6–8.5)
PSA FREE MFR SERPL: 35 %
PSA FREE SERPL-MCNC: 0.28 NG/ML
PSA SERPL-MCNC: 0.8 NG/ML (ref 0–4)
RBC # BLD AUTO: 5.52 X10E6/UL (ref 4.14–5.8)
SL AMB VLDL CHOLESTEROL CALC: 17 MG/DL (ref 5–40)
SODIUM SERPL-SCNC: 141 MMOL/L (ref 134–144)
TRIGL SERPL-MCNC: 97 MG/DL (ref 0–149)
TSH SERPL DL<=0.005 MIU/L-ACNC: 2.24 UIU/ML (ref 0.45–4.5)
WBC # BLD AUTO: 5.3 X10E3/UL (ref 3.4–10.8)

## 2022-08-22 ENCOUNTER — TELEPHONE (OUTPATIENT)
Dept: FAMILY MEDICINE CLINIC | Facility: CLINIC | Age: 62
End: 2022-08-22

## 2022-08-31 ENCOUNTER — OFFICE VISIT (OUTPATIENT)
Dept: FAMILY MEDICINE CLINIC | Facility: CLINIC | Age: 62
End: 2022-08-31
Payer: COMMERCIAL

## 2022-08-31 VITALS
BODY MASS INDEX: 27.33 KG/M2 | DIASTOLIC BLOOD PRESSURE: 88 MMHG | WEIGHT: 201.8 LBS | OXYGEN SATURATION: 99 % | SYSTOLIC BLOOD PRESSURE: 130 MMHG | HEIGHT: 72 IN | TEMPERATURE: 95.8 F | HEART RATE: 56 BPM

## 2022-08-31 DIAGNOSIS — I10 ESSENTIAL HYPERTENSION: ICD-10-CM

## 2022-08-31 DIAGNOSIS — E78.2 HYPERLIPIDEMIA, MIXED: ICD-10-CM

## 2022-08-31 DIAGNOSIS — E78.2 MIXED HYPERLIPIDEMIA: ICD-10-CM

## 2022-08-31 DIAGNOSIS — Z00.00 ANNUAL PHYSICAL EXAM: Primary | ICD-10-CM

## 2022-08-31 PROCEDURE — 3725F SCREEN DEPRESSION PERFORMED: CPT | Performed by: NURSE PRACTITIONER

## 2022-08-31 PROCEDURE — 99396 PREV VISIT EST AGE 40-64: CPT | Performed by: NURSE PRACTITIONER

## 2022-08-31 NOTE — PATIENT INSTRUCTIONS
Check cholesterol labs in 6 months- red yeast rice, coQ10        Wellness Visit for Adults   AMBULATORY CARE:   A wellness visit  is when you see your healthcare provider to get screened for health problems  Your healthcare provider will also give you advice on how to stay healthy  Write down your questions so you remember to ask them  Ask your healthcare provider how often you should have a wellness visit  What happens at a wellness visit:  Your healthcare provider will ask about your health, and your family history of health problems  This includes high blood pressure, heart disease, and cancer  He or she will ask if you have symptoms that concern you, if you smoke, and about your mood  You may also be asked about your intake of medicines, supplements, food, and alcohol  Any of the following may be done: Your weight  will be checked  Your height may also be checked so your body mass index (BMI) can be calculated  Your BMI shows if you are at a healthy weight  Your blood pressure  and heart rate will be checked  Your temperature may also be checked  Blood and urine tests  may be done  Blood tests may be done to check your cholesterol levels  Abnormal cholesterol levels increase your risk for heart disease and stroke  You may also need a blood or urine test to check for diabetes if you are at increased risk  Urine tests may be done to look for signs of an infection or kidney disease  A physical exam  includes checking your heartbeat and lungs with a stethoscope  Your healthcare provider may also check your skin to look for sun damage  Screening tests  may be recommended  A screening test is done to check for diseases that may not cause symptoms  The screening tests you may need depend on your age, gender, family history, and lifestyle habits  For example, colorectal screening may be recommended if you are 48years old or older      Screening tests you need if you are a woman:   A Pap smear  is used to screen for cervical cancer  Pap smears are usually done every 3 to 5 years depending on your age  You may need them more often if you have had abnormal Pap smear test results in the past  Ask your healthcare provider how often you should have a Pap smear  A mammogram  is an x-ray of your breasts to screen for breast cancer  Experts recommend mammograms every 2 years starting at age 48 years  You may need a mammogram at age 52 years or younger if you have an increased risk for breast cancer  Talk to your healthcare provider about when you should start having mammograms and how often you need them  Vaccines you may need:   Get an influenza vaccine  every year  The influenza vaccine protects you from the flu  Several types of viruses cause the flu  The viruses change over time, so new vaccines are made each year  Get a tetanus-diphtheria (Td) booster vaccine  every 10 years  This vaccine protects you against tetanus and diphtheria  Tetanus is a severe infection that may cause painful muscle spasms and lockjaw  Diphtheria is a severe bacterial infection that causes a thick covering in the back of your mouth and throat  Get a human papillomavirus (HPV) vaccine  if you are female and aged 23 to 32 or male 23 to 24 and never received it  This vaccine protects you from HPV infection  HPV is the most common infection spread by sexual contact  HPV may also cause vaginal, penile, and anal cancers  Get a pneumococcal vaccine  if you are aged 72 years or older  The pneumococcal vaccine is an injection given to protect you from pneumococcal disease  Pneumococcal disease is an infection caused by pneumococcal bacteria  The infection may cause pneumonia, meningitis, or an ear infection  Get a shingles vaccine  if you are 60 or older, even if you have had shingles before  The shingles vaccine is an injection to protect you from the varicella-zoster virus  This is the same virus that causes chickenpox   Shingles is a painful rash that develops in people who had chickenpox or have been exposed to the virus  How to eat healthy:  My Plate is a model for planning healthy meals  It shows the types and amounts of foods that should go on your plate  Fruits and vegetables make up about half of your plate, and grains and protein make up the other half  A serving of dairy is included on the side of your plate  The amount of calories and serving sizes you need depends on your age, gender, weight, and height  Examples of healthy foods are listed below:  Eat a variety of vegetables  such as dark green, red, and orange vegetables  You can also include canned vegetables low in sodium (salt) and frozen vegetables without added butter or sauces  Eat a variety of fresh fruits , canned fruit in 100% juice, frozen fruit, and dried fruit  Include whole grains  At least half of the grains you eat should be whole grains  Examples include whole-wheat bread, wheat pasta, brown rice, and whole-grain cereals such as oatmeal     Eat a variety of protein foods such as seafood (fish and shellfish), lean meat, and poultry without skin (turkey and chicken)  Examples of lean meats include pork leg, shoulder, or tenderloin, and beef round, sirloin, tenderloin, and extra lean ground beef  Other protein foods include eggs and egg substitutes, beans, peas, soy products, nuts, and seeds  Choose low-fat dairy products such as skim or 1% milk or low-fat yogurt, cheese, and cottage cheese  Limit unhealthy fats  such as butter, hard margarine, and shortening  Exercise:  Exercise at least 30 minutes per day on most days of the week  Some examples of exercise include walking, biking, dancing, and swimming  You can also fit in more physical activity by taking the stairs instead of the elevator or parking farther away from stores  Include muscle strengthening activities 2 days each week  Regular exercise provides many health benefits   It helps you manage your weight, and decreases your risk for type 2 diabetes, heart disease, stroke, and high blood pressure  Exercise can also help improve your mood  Ask your healthcare provider about the best exercise plan for you  General health and safety guidelines:   Do not smoke  Nicotine and other chemicals in cigarettes and cigars can cause lung damage  Ask your healthcare provider for information if you currently smoke and need help to quit  E-cigarettes or smokeless tobacco still contain nicotine  Talk to your healthcare provider before you use these products  Limit alcohol  A drink of alcohol is 12 ounces of beer, 5 ounces of wine, or 1½ ounces of liquor  Lose weight, if needed  Being overweight increases your risk of certain health conditions  These include heart disease, high blood pressure, type 2 diabetes, and certain types of cancer  Protect your skin  Do not sunbathe or use tanning beds  Use sunscreen with a SPF 15 or higher  Apply sunscreen at least 15 minutes before you go outside  Reapply sunscreen every 2 hours  Wear protective clothing, hats, and sunglasses when you are outside  Drive safely  Always wear your seatbelt  Make sure everyone in your car wears a seatbelt  A seatbelt can save your life if you are in an accident  Do not use your cell phone when you are driving  This could distract you and cause an accident  Pull over if you need to make a call or send a text message  Practice safe sex  Use latex condoms if are sexually active and have more than one partner  Your healthcare provider may recommend screening tests for sexually transmitted infections (STIs)  Wear helmets, lifejackets, and protective gear  Always wear a helmet when you ride a bike or motorcycle, go skiing, or play sports that could cause a head injury  Wear protective equipment when you play sports  Wear a lifejacket when you are on a boat or doing water sports      © Copyright [employer] 2022 Information is for End User's use only and may not be sold, redistributed or otherwise used for commercial purposes  All illustrations and images included in CareNotes® are the copyrighted property of [employer]  or [employer]  The above information is an  only  It is not intended as medical advice for individual conditions or treatments  Talk to your doctor, nurse or pharmacist before following any medical regimen to see if it is safe and effective for you  Weight Management   AMBULATORY CARE:   Why it is important to manage your weight:  Being overweight increases your risk of health conditions such as heart disease, high blood pressure, type 2 diabetes, and certain types of cancer  It can also increase your risk for osteoarthritis, sleep apnea, and other respiratory problems  Aim for a slow, steady weight loss  Even a small amount of weight loss can lower your risk of health problems  Risks of being overweight:  Extra weight can cause many health problems, including the following:  Diabetes (high blood sugar level)    High blood pressure or high cholesterol    Heart disease    Stroke    Gallbladder or liver disease    Cancer of the colon, breast, prostate, liver, or kidney    Sleep apnea    Arthritis or gout    Screening  is done to check for health conditions before you have signs or symptoms  If you are 28to 79years old, your blood sugar level may be checked every 3 years for signs of prediabetes or diabetes  Your healthcare provider will check your blood pressure at each visit  High blood pressure can lead to a stroke or other problems  Your provider may check for signs of heart disease, cancer, or other health problems  How to lose weight safely:  A safe and healthy way to lose weight is to eat fewer calories and get regular exercise  You can lose up about 1 pound a week by decreasing the number of calories you eat by 500 calories each day   You can decrease calories by eating smaller portion sizes or by cutting out high-calorie foods  Read labels to find out how many calories are in the foods you eat  You can also burn calories with exercise such as walking, swimming, or biking  You will be more likely to keep weight off if you make these changes part of your lifestyle  Exercise at least 30 minutes per day on most days of the week  You can also fit in more physical activity by taking the stairs instead of the elevator or parking farther away from stores  Ask your healthcare provider about the best exercise plan for you  Healthy meal plan for weight management:  A healthy meal plan includes a variety of foods, contains fewer calories, and helps you stay healthy  A healthy meal plan includes the following:     Eat whole-grain foods more often  A healthy meal plan should contain fiber  Fiber is the part of grains, fruits, and vegetables that is not broken down by your body  Whole-grain foods are healthy and provide extra fiber in your diet  Some examples of whole-grain foods are whole-wheat breads and pastas, oatmeal, brown rice, and bulgur  Eat a variety of vegetables every day  Include dark, leafy greens such as spinach, kale, katya greens, and mustard greens  Eat yellow and orange vegetables such as carrots, sweet potatoes, and winter squash  Eat a variety of fruits every day  Choose fresh or canned fruit (canned in its own juice or light syrup) instead of juice  Fruit juice has very little or no fiber  Eat low-fat dairy foods  Drink fat-free (skim) milk or 1% milk  Eat fat-free yogurt and low-fat cottage cheese  Try low-fat cheeses such as mozzarella and other reduced-fat cheeses  Choose meat and other protein foods that are low in fat  Choose beans or other legumes such as split peas or lentils  Choose fish, skinless poultry (chicken or turkey), or lean cuts of red meat (beef or pork)  Before you cook meat or poultry, cut off any visible fat       Use less fat and oil  Try baking foods instead of frying them  Add less fat, such as margarine, sour cream, regular salad dressing and mayonnaise to foods  Eat fewer high-fat foods  Some examples of high-fat foods include french fries, doughnuts, ice cream, and cakes  Eat fewer sweets  Limit foods and drinks that are high in sugar  This includes candy, cookies, regular soda, and sweetened drinks  Ways to decrease calories:   Eat smaller portions  Use a small plate with smaller servings  Do not eat second helpings  When you eat at a restaurant, ask for a box and place half of your meal in the box before you eat  Share an entrée with someone else  Replace high-calorie snacks with healthy, low-calorie snacks  Choose fresh fruit, vegetables, fat-free rice cakes, or air-popped popcorn instead of potato chips, nuts, or chocolate  Choose water or calorie-free drinks instead of soda or sweetened drinks  Do not shop for groceries when you are hungry  You may be more likely to make unhealthy food choices  Take a grocery list of healthy foods and shop after you have eaten  Eat regular meals  Do not skip meals  Skipping meals can lead to overeating later in the day  This can make it harder for you to lose weight  Eat a healthy snack in place of a meal if you do not have time to eat a regular meal  Talk with a dietitian to help you create a meal plan and schedule that is right for you  Other things to consider as you try to lose weight:   Be aware of situations that may give you the urge to overeat, such as eating while watching television  Find ways to avoid these situations  For example, read a book, go for a walk, or do crafts  Meet with a weight loss support group or friends who are also trying to lose weight  This may help you stay motivated to continue working on your weight loss goals      © Copyright [employer] 2022 Information is for End User's use only and may not be sold, redistributed or otherwise used for commercial purposes  All illustrations and images included in CareNotes® are the copyrighted property of [employer]  or [employer]  The above information is an  only  It is not intended as medical advice for individual conditions or treatments  Talk to your doctor, nurse or pharmacist before following any medical regimen to see if it is safe and effective for you

## 2022-08-31 NOTE — PROGRESS NOTES
[address] [facility]    NAME: Patria Massey  AGE: 64 y o  SEX: male  : 1960     DATE: 2022     Assessment and Plan:     Problem List Items Addressed This Visit        Cardiovascular and Mediastinum    Essential hypertension     Stable, continue current medication regimen              Other    Hyperlipidemia, mixed      , [place] risk score 14% higher than previous years  Stress test 2019 normal  Follows with cardiology yearly  Discussed statin therapy especially with fathers cardiac history of MI, stenting, high plaque and stroke  Wants to try diet modifications- suggested red yeast rice recheck in 6 months, take with cardiologist in fall about statin therapy  Can also consider CT Calcium Scoring           Other Visit Diagnoses     Annual physical exam    -  Primary    BMI 27 0-27 9,adult        Mixed hyperlipidemia        Relevant Orders    Lipid panel          Immunizations and preventive care screenings were discussed with patient today  Appropriate education was printed on patient's after visit summary  Counseling:  Alcohol/drug use: discussed moderation in alcohol intake, the recommendations for healthy alcohol use, and avoidance of illicit drug use  Dental Health: discussed importance of regular tooth brushing, flossing, and dental visits  Injury prevention: discussed safety/seat belts, safety helmets, smoke detectors, carbon dioxide detectors, and smoking near bedding or upholstery  Sexual health: discussed sexually transmitted diseases, partner selection, use of condoms, avoidance of unintended pregnancy, and contraceptive alternatives  · Exercise: the importance of regular exercise/physical activity was discussed  Recommend exercise 3-5 times per week for at least 30 minutes  BMI Counseling: Body mass index is 27 37 kg/m²   The BMI is above normal  Nutrition recommendations include decreasing portion sizes, reducing intake of saturated and trans fat and reducing intake of cholesterol  Exercise recommendations include vigorous physical activity 75 minutes/week  Rationale for BMI follow-up plan is due to patient being overweight or obese  Depression Screening and Follow-up Plan: Patient was screened for depression during today's encounter  They screened negative with a PHQ-2 score of 0  No follow-ups on file  Chief Complaint:     Chief Complaint   Patient presents with    Physical Exam     go over his blood work he had done,       History of Present Illness:     Adult Annual Physical   Patient here for a comprehensive physical exam  The patient reports no problems  Follows with cardiology yearly-stress test 2019 no ischemia  Father with MI at 76  Diet and Physical Activity  · Diet/Nutrition: well balanced diet, consuming 3-5 servings of fruits/vegetables daily, adequate fiber intake and adequate whole grain intake  · Exercise: vigorous cardiovascular exercise, strength training exercises and 5-7 times a week on average  Depression Screening  PHQ-2/9 Depression Screening    Little interest or pleasure in doing things: 0 - not at all  Feeling down, depressed, or hopeless: 0 - not at all  PHQ-2 Score: 0  PHQ-2 Interpretation: Negative depression screen       General Health  · Sleep: sleeps well  · Hearing: normal - bilateral   · Vision: no vision problems  · Dental: regular dental visits   Health  · Symptoms include: none     Review of Systems:     Review of Systems   Constitutional: Negative  HENT: Negative  Eyes: Negative  Respiratory: Negative  Cardiovascular: Negative  Gastrointestinal: Negative  Endocrine: Negative  Genitourinary: Negative           Some inguinal pain at times- ongoing > 1 year, US abdomen/pelvis normal- f/u with urology was normal- no palpable hernias, no enlarged prostate     Musculoskeletal: Positive for myalgias (across low abdomen- rubberband, tightness)  Skin: Negative  Allergic/Immunologic: Negative  Neurological: Negative  Hematological: Negative  Psychiatric/Behavioral: Negative         Past Medical History:     Past Medical History:   Diagnosis Date    HL (hearing loss)     rining of ears    Hypertension     Shingles       Past Surgical History:     Past Surgical History:   Procedure Laterality Date    VENTRAL HERNIA REPAIR  2001      Family History:     Family History   Problem Relation Age of Onset    Cancer Mother     Hypertension Father     Prostate cancer Father     Heart attack Father     Autism Son       Social History:     Social History     Socioeconomic History    Marital status: /Civil Union     Spouse name: None    Number of children: None    Years of education: None    Highest education level: None   Occupational History    None   Tobacco Use    Smoking status: Former Smoker    Smokeless tobacco: Former User   Vaping Use    Vaping Use: Never used   Substance and Sexual Activity    Alcohol use: Yes     Comment: 16 oz per week     Drug use: Yes     Comment: 30 years ago     Sexual activity: None   Other Topics Concern    None   Social History Narrative    None     Social Determinants of Health     Financial Resource Strain: Not on file   Food Insecurity: Not on file   Transportation Needs: Not on file   Physical Activity: Not on file   Stress: Not on file   Social Connections: Not on file   Intimate Partner Violence: Not on file   Housing Stability: Not on file      Current Medications:     Current Outpatient Medications   Medication Sig Dispense Refill    amLODIPine (NORVASC) 10 mg tablet Take 10 mg by mouth daily  3    Ascorbic Acid (VITAMIN C PO) Take by mouth      aspirin (ECOTRIN LOW STRENGTH) 81 mg EC tablet take 1 tablet by oral route  every day      Cholecalciferol (VITAMIN D PO) Take by mouth      co-enzyme Q-10 30 MG capsule Take 30 mg by mouth 3 (three) times a day      magnesium 30 MG tablet Take 30 mg by mouth 2 (two) times a day      Omega-3 Fatty Acids (FISH OIL PO) Take by mouth      Probiotic Product (PROBIOTIC PO) Take by mouth      Turmeric, Curcuma Longa, (CURCUMIN) POWD by Does not apply route       No current facility-administered medications for this visit  Allergies:     No Known Allergies   Physical Exam:     /88   Pulse 56   Temp (!) 95 8 °F (35 4 °C)   Ht 6' (1 829 m)   Wt 91 5 kg (201 lb 12 8 oz)   SpO2 99%   BMI 27 37 kg/m²     Physical Exam  Vitals and nursing note reviewed  Constitutional:       Appearance: Normal appearance  He is well-developed and normal weight  Comments: Muscular build, not overweight, minimal body fat     HENT:      Head: Normocephalic and atraumatic  Right Ear: Tympanic membrane and ear canal normal       Left Ear: Tympanic membrane and ear canal normal       Nose: Nose normal       Mouth/Throat:      Mouth: Mucous membranes are moist       Pharynx: Oropharynx is clear  Eyes:      Conjunctiva/sclera: Conjunctivae normal       Pupils: Pupils are equal, round, and reactive to light  Cardiovascular:      Rate and Rhythm: Normal rate and regular rhythm  Pulses:           Radial pulses are 2+ on the right side and 2+ on the left side  Heart sounds: Normal heart sounds  No murmur heard  Pulmonary:      Effort: Pulmonary effort is normal  No respiratory distress  Breath sounds: Normal breath sounds  Abdominal:      General: Abdomen is flat  Bowel sounds are normal       Palpations: Abdomen is soft  Tenderness: There is no abdominal tenderness  Musculoskeletal:         General: Normal range of motion  Cervical back: Neck supple  Right lower leg: No edema  Left lower leg: No edema  Skin:     General: Skin is warm and dry  Capillary Refill: Capillary refill takes less than 2 seconds     Neurological:      Mental Status: He is alert and oriented to person, place, and time  Psychiatric:         Mood and Affect: Mood normal          Behavior: Behavior normal          Thought Content:  Thought content normal          Judgment: Judgment normal           Jocelyn Jeknins, [address] [facility]

## 2022-08-31 NOTE — ASSESSMENT & PLAN NOTE
, [place] risk score 14% higher than previous years  Stress test 2019 normal  Follows with cardiology yearly  Discussed statin therapy especially with fathers cardiac history of MI, stenting, high plaque and stroke  Wants to try diet modifications- suggested red yeast rice recheck in 6 months, take with cardiologist in fall about statin therapy    Can also consider CT Calcium Scoring

## 2022-08-31 NOTE — RESULT ENCOUNTER NOTE
Reviewed with patient cholesterol elevated, exercises 7 days a week, diet is stable   Likely genetics will try red yeast rice, coq10- recheck in 6 months

## 2023-02-13 ENCOUNTER — OFFICE VISIT (OUTPATIENT)
Dept: FAMILY MEDICINE CLINIC | Facility: CLINIC | Age: 63
End: 2023-02-13

## 2023-02-13 VITALS
OXYGEN SATURATION: 98 % | DIASTOLIC BLOOD PRESSURE: 72 MMHG | HEART RATE: 57 BPM | WEIGHT: 198 LBS | BODY MASS INDEX: 26.82 KG/M2 | HEIGHT: 72 IN | TEMPERATURE: 97.2 F | SYSTOLIC BLOOD PRESSURE: 120 MMHG

## 2023-02-13 DIAGNOSIS — R42 VERTIGO: Primary | ICD-10-CM

## 2023-02-13 DIAGNOSIS — H93.13 TINNITUS OF BOTH EARS: ICD-10-CM

## 2023-02-13 RX ORDER — IBUPROFEN 600 MG/1
TABLET ORAL
COMMUNITY
Start: 2023-01-10

## 2023-02-13 NOTE — PROGRESS NOTES
Name: Fabrice Crespo      : 1960      MRN: [id number]  Encounter Provider: POONAM Esquivel  Encounter Date: 2023   Encounter department: [place] [id number]  Vertigo  Assessment & Plan:  Referral for vestibular therapy  If persists or reoccur often will recommend ENT evaluation    Orders:  -     Ambulatory Referral to Physical Therapy; Future    2  Tinnitus of both ears  Assessment & Plan:  Chronic stable           Subjective      6 years ago had vertigo for first time, has had two other bouts last few years  Bout in 2022 was significant with nausea  A week or two ago had another bout  Usually happening with awakening  +nausea with symptoms  Epley Maneuver helped in December  No recent URIs  Had Donny in October lost sense of smell and taste but otherwise mild symptoms  No other complaints  Has significant tinnitus and hearing loss- has seen ENT in past but not for years  No medication changes             Review of Systems   Constitutional: Negative  HENT: Negative  Eyes: Negative  Respiratory: Negative  Cardiovascular: Negative  Gastrointestinal: Negative  Endocrine: Negative  Genitourinary: Negative  Musculoskeletal: Negative  Skin: Negative  Neurological: Positive for dizziness  Hematological: Negative  Psychiatric/Behavioral: Negative          Current Outpatient Medications on File Prior to Visit   Medication Sig   • amLODIPine (NORVASC) 10 mg tablet Take 10 mg by mouth daily   • Ascorbic Acid (VITAMIN C PO) Take by mouth   • aspirin (ECOTRIN LOW STRENGTH) 81 mg EC tablet take 1 tablet by oral route  every day   • Cholecalciferol (VITAMIN D PO) Take by mouth   • co-enzyme Q-10 30 MG capsule Take 30 mg by mouth 3 (three) times a day   • magnesium 30 MG tablet Take 30 mg by mouth 2 (two) times a day   • Omega-3 Fatty Acids (FISH OIL PO) Take by mouth   • Probiotic Product (PROBIOTIC PO) Take by mouth   • Turmeric, Curcuma Longa, (CURCUMIN) POWD by Does not apply route   • ibuprofen (MOTRIN) 600 mg tablet TAKE 1 TABLET BY MOUTH EVERY 4 TO 6 HOURS AS NEEDED FOR PAIN 1 HRS BEFORE APPOINTMENT (Patient not taking: Reported on 2/13/2023)       Objective     /72   Pulse 57   Temp (!) 97 2 °F (36 2 °C) (Tympanic)   Ht 6' (1 829 m)   Wt 89 8 kg (198 lb)   SpO2 98%   BMI 26 85 kg/m²     Physical Exam  Vitals and nursing note reviewed  Constitutional:       Appearance: Normal appearance  HENT:      Head: Normocephalic  Right Ear: Tympanic membrane, ear canal and external ear normal       Left Ear: Tympanic membrane, ear canal and external ear normal       Nose: Nose normal       Mouth/Throat:      Mouth: Mucous membranes are moist       Pharynx: Oropharynx is clear  Eyes:      Extraocular Movements: Extraocular movements intact  Left eye: Nystagmus present  Conjunctiva/sclera: Conjunctivae normal    Neck:      Vascular: No carotid bruit  Cardiovascular:      Rate and Rhythm: Normal rate and regular rhythm  Heart sounds: Normal heart sounds  No murmur heard  Pulmonary:      Effort: Pulmonary effort is normal  No respiratory distress  Breath sounds: Normal breath sounds  Abdominal:      Palpations: Abdomen is soft  Lymphadenopathy:      Cervical: No cervical adenopathy  Skin:     Findings: No rash  Neurological:      Mental Status: He is alert and oriented to person, place, and time         [facility]

## 2023-02-21 ENCOUNTER — EVALUATION (OUTPATIENT)
Dept: PHYSICAL THERAPY | Facility: CLINIC | Age: 63
End: 2023-02-21

## 2023-02-21 DIAGNOSIS — M43.6 NECK STIFFNESS: ICD-10-CM

## 2023-02-21 DIAGNOSIS — R42 DIZZINESS: Primary | ICD-10-CM

## 2023-02-21 DIAGNOSIS — R42 VERTIGO: ICD-10-CM

## 2023-02-21 NOTE — PROGRESS NOTES
PT Evaluation     Today's date: 2023  Patient name: Juan Frances  : 1960  MRN: [id number]  Referring provider: Ulysses Greaves, *  Dx:   Encounter Diagnosis     ICD-10-CM    1  Dizziness  R42       2  Neck stiffness  M43 6       3  Vertigo  R42                      Assessment  Assessment details: Juan Frances is a 58 y o  male presenting to outpatient physical therapy at [facility] with complaints of chronic history of vertigo and dizziness especially when he gets up from a lying position   He presents with decreased range of motion, decreased strength, limited flexibility, poor postural awareness, poor body mechanics, altered gait pattern, poor balance, decreased tolerance to activity and decreased functional mobility due to Dizziness  (primary encounter diagnosis), Neck stiffness, Vertigo  Therapist discussed diagnosis, prognosis, plan of care, proper responses to exercises, HEP, and modalities to use at home   [facility] would benefit from skilled PT services in order to address these deficits and reach maximum level of function   Thank you for the referral!    Impairments: abnormal coordination, abnormal gait, abnormal muscle firing, abnormal muscle tone, abnormal or restricted ROM, abnormal movement, activity intolerance, impaired balance, impaired physical strength, lacks appropriate home exercise program, pain with function, scapular dyskinesis, weight-bearing intolerance, poor posture  and poor body mechanics    Symptom irritability: moderateUnderstanding of Dx/Px/POC: good   Prognosis: good    Goals  STGs (in 4 weeks):  1  Pt will report having at least a 50% improvement since I E   2  Pt will demonstrate full CROM in all planes without onset of sxs  LTGs (in 12 weeks):  1  Pt will report having at least a 75% improvement since I E    2  Pt will be independent with HEP  3  Pt will deny having sxs with bed mobility  4  Pt will deny having fogginess during ADLs, exercise, and IADLs  Plan  Patient would benefit from: skilled physical therapy  Planned modality interventions: unattended electrical stimulation and TENS  Planned therapy interventions: joint mobilization, manual therapy, neuromuscular re-education, patient education, self care, strengthening, stretching, therapeutic activities, therapeutic exercise, home exercise program, flexibility and balance  Frequency: 2x week  Plan of Care beginning date: 2023  Plan of Care expiration date: 2023  Treatment plan discussed with: patient        Subjective Evaluation    History of Present Illness  Mechanism of injury: Pt is a 58year old male who presents with chronic history of dizziness and vertigo  He reports that initially he had sxs approximately 7-8 years ago when he got out of bed  He reports that with time his sxs would improve  He reports that last December he woke up in the middle of the night with sxs but was able to perform home Epley maneuver which decreased his sxs  He also reports that after being in the dentist and having a massage has in the past caused increased vertigo/spinning when he would sit up  Now referred to skilled OP PT  Quality of life: good    Pain  Current pain ratin  At best pain ratin  At worst pain ratin  Quality: dizziness, spinning, fogginess  Relieving factors: relaxation and rest  Exacerbated by: rolling over in bed, supine to sitting, looking up  Progression: no change    Treatments  Current treatment: massage  Current treatment comments: accupunture        Patient Goals  Patient goals for therapy: decreased pain, improved balance, increased motion, increased strength, independence with ADLs/IADLs and return to sport/leisure activities          Objective     Observation/Posture: FHP, slight rounded shoulders    Cervical Screen  (*=pain)  (**=symptom provocation)    AROM: (deg)  Flexion: 45  Extension: 50  R Rot: 65  L Rot: 60  R Lat Flex: 35  L Lat Flex: 33    Palpation/Tenderness: TTPr B UT, LS (L worse than R); cervical paraspinals     Joint Mobility:  Decreased PA glides in cervical and thoracic     Strength: (MMT)    R  L  S' Flex  4/5  4/5  S' ABD  4/5  4/5  S' ER  TBA  TBA    UT  TBA  TBA  MT  TBA  TBA  LT  TBA  TBA    Visual-Occular and Vestibular Testing:  Smooth pursuits: negative   Convergence: TBA  Saccades: TBA  VORx1: TBA  Jacob-Hallpike: (R) positive (L) negative     Special Tests  Vertebral Artery: negative     Precautions: standard    HEP: chin tucks, upper trap stretch, levator scap stretch, thoracic foam roller    Specialty Daily Treatment Diary       Manual 2/21       SOR SMF       TPR/STM B UT, LS, cervical paraspinals SMF ea       B UT stretch SMF ea       B LS stretch SMF ea               Exercise Diary         Epley manuever (R) x2               UBE (retro)                TB Scap Retraction -> [place]        TB B S' Ext -> [place]                TB B ER        TB B Horiz ABD                Low pec stretch        Mod pec stretch                Seated Chin tucks        Seated chin tuck to extension        B UT stretch        B LS stretch        Seated Thoracic Extension over half foam                Supine chin tucks  5 sec x 10               Sidelying Thoracic Rotation stretch                Prone Y        Prone T        Prone I        Prone W                        Thoracic Extension over foam roller 2 mins                       HEP/EDU Diagnosis, prognosis, POC, proper responses to exercises, HEP, modalities       Modalities                CP                   Skin checks performed pre and post application: intact

## 2023-03-01 ENCOUNTER — APPOINTMENT (OUTPATIENT)
Dept: PHYSICAL THERAPY | Facility: CLINIC | Age: 63
End: 2023-03-01

## 2023-03-06 ENCOUNTER — OFFICE VISIT (OUTPATIENT)
Dept: PHYSICAL THERAPY | Facility: CLINIC | Age: 63
End: 2023-03-06

## 2023-03-06 DIAGNOSIS — R42 DIZZINESS: Primary | ICD-10-CM

## 2023-03-06 DIAGNOSIS — R42 VERTIGO: ICD-10-CM

## 2023-03-06 DIAGNOSIS — M43.6 NECK STIFFNESS: ICD-10-CM

## 2023-03-06 NOTE — PROGRESS NOTES
Daily Note     Today's date: 3/6/2023  Patient name: Neri Joya  : 1960  MRN: [id number]  Referring provider: Musa Farfan, *  Dx:   Encounter Diagnosis     ICD-10-CM    1  Dizziness  R42       2  Neck stiffness  M43 6       3  Vertigo  R42                      Subjective: Patient reports that he has felt significantly better with the neck stretching  Objective: See treatment diary below      Assessment: Tolerated treatment well; initiated POC with UBE for active warmup  MT performed after receiving consent from pt  VCs provided on proper sequencing with exercises  Updated and reviewed HEP  Due to having no sxs, he is on hold for two weeks and will contact therapist on progress  Patient demonstrated fatigue post treatment and would benefit from continued PT      Plan: Continue per plan of care            Precautions: standard    HEP: chin tucks, upper trap stretch, levator scap stretch, thoracic foam roller    Specialty Daily Treatment Diary       Manual 2/21 3/6      SOR SMF SMF      TPR/STM B UT, LS, cervical paraspinals SMF ea SMF ea      B UT stretch SMF ea SMF ea      B LS stretch SMF ea SMF ea      Pa glides c/s  SMF      OP Cervical retraction in to extension  SMF              Exercise Diary         Epley manuever (R) x2               UBE (retro)  5 mins retro only              TB Scap Retraction -> [place]        TB B S' Ext -> [place]                TB B ER        TB B Horiz ABD                Low pec stretch        Mod pec stretch                Seated Chin tucks        Seated chin tuck to extension        B UT stretch c chin tuck  20 sec x 3 ea      B LS stretch        B SCM stretch  15 sec x 5 ea      Seated Thoracic Extension over half foam        Standing thoracic extension hands behind neck elbows on wall sliding up  2 sec x 10              Supine chin tucks  5 sec x 10 5 sec x 20      Supine half foam roller pec stretch  10 sec x 10              Sidelying Thoracic Rotation stretch  5 sec x 20 ea              Prone Y        Prone T        Prone I        Prone W                        Thoracic Extension over foam roller 2 mins                       HEP/EDU Diagnosis, prognosis, POC, proper responses to exercises, HEP, modalities       Modalities                CP                   Skin checks performed pre and post application: intact

## 2023-07-03 DIAGNOSIS — Z13.21 SCREENING FOR ENDOCRINE, NUTRITIONAL, METABOLIC AND IMMUNITY DISORDER: ICD-10-CM

## 2023-07-03 DIAGNOSIS — Z13.220 SCREENING FOR LIPID DISORDERS: ICD-10-CM

## 2023-07-03 DIAGNOSIS — N41.0 ACUTE PROSTATITIS: ICD-10-CM

## 2023-07-03 DIAGNOSIS — Z13.29 SCREENING FOR THYROID DISORDER: ICD-10-CM

## 2023-07-03 DIAGNOSIS — E78.2 HYPERLIPIDEMIA, MIXED: Primary | ICD-10-CM

## 2023-07-03 DIAGNOSIS — Z13.228 SCREENING FOR ENDOCRINE, NUTRITIONAL, METABOLIC AND IMMUNITY DISORDER: ICD-10-CM

## 2023-07-03 DIAGNOSIS — R35.0 URINARY FREQUENCY: ICD-10-CM

## 2023-07-03 DIAGNOSIS — Z13.0 SCREENING FOR ENDOCRINE, NUTRITIONAL, METABOLIC AND IMMUNITY DISORDER: ICD-10-CM

## 2023-07-03 DIAGNOSIS — Z12.5 SPECIAL SCREENING FOR MALIGNANT NEOPLASM OF PROSTATE: ICD-10-CM

## 2023-07-03 DIAGNOSIS — Z13.29 SCREENING FOR ENDOCRINE, NUTRITIONAL, METABOLIC AND IMMUNITY DISORDER: ICD-10-CM

## 2023-07-03 DIAGNOSIS — I10 ESSENTIAL HYPERTENSION: ICD-10-CM

## 2023-08-29 LAB
25(OH)D3+25(OH)D2 SERPL-MCNC: 56.2 NG/ML (ref 30–100)
ALBUMIN SERPL-MCNC: 4.3 G/DL (ref 3.9–4.9)
ALBUMIN/GLOB SERPL: 2.3 {RATIO} (ref 1.2–2.2)
ALP SERPL-CCNC: 73 IU/L (ref 44–121)
ALT SERPL-CCNC: 23 IU/L (ref 0–44)
AST SERPL-CCNC: 27 IU/L (ref 0–40)
BASOPHILS # BLD AUTO: 0.1 X10E3/UL (ref 0–0.2)
BASOPHILS NFR BLD AUTO: 1 %
BILIRUB SERPL-MCNC: 0.6 MG/DL (ref 0–1.2)
BUN SERPL-MCNC: 16 MG/DL (ref 8–27)
BUN/CREAT SERPL: 19 (ref 10–24)
CALCIUM SERPL-MCNC: 9.6 MG/DL (ref 8.6–10.2)
CHLORIDE SERPL-SCNC: 105 MMOL/L (ref 96–106)
CHOLEST SERPL-MCNC: 248 MG/DL (ref 100–199)
CO2 SERPL-SCNC: 25 MMOL/L (ref 20–29)
CREAT SERPL-MCNC: 0.83 MG/DL (ref 0.76–1.27)
EGFR: 99 ML/MIN/1.73
EOSINOPHIL # BLD AUTO: 0.2 X10E3/UL (ref 0–0.4)
EOSINOPHIL NFR BLD AUTO: 4 %
ERYTHROCYTE [DISTWIDTH] IN BLOOD BY AUTOMATED COUNT: 12.6 % (ref 11.6–15.4)
GLOBULIN SER-MCNC: 1.9 G/DL (ref 1.5–4.5)
GLUCOSE SERPL-MCNC: 97 MG/DL (ref 70–99)
HCT VFR BLD AUTO: 47.2 % (ref 37.5–51)
HDLC SERPL-MCNC: 60 MG/DL
HGB BLD-MCNC: 16.7 G/DL (ref 13–17.7)
IMM GRANULOCYTES # BLD: 0 X10E3/UL (ref 0–0.1)
IMM GRANULOCYTES NFR BLD: 0 %
LDLC SERPL CALC-MCNC: 176 MG/DL (ref 0–99)
LDLC/HDLC SERPL: 2.9 RATIO (ref 0–3.6)
LYMPHOCYTES # BLD AUTO: 1.7 X10E3/UL (ref 0.7–3.1)
LYMPHOCYTES NFR BLD AUTO: 30 %
MCH RBC QN AUTO: 31.2 PG (ref 26.6–33)
MCHC RBC AUTO-ENTMCNC: 35.4 G/DL (ref 31.5–35.7)
MCV RBC AUTO: 88 FL (ref 79–97)
MONOCYTES # BLD AUTO: 0.5 X10E3/UL (ref 0.1–0.9)
MONOCYTES NFR BLD AUTO: 8 %
NEUTROPHILS # BLD AUTO: 3.2 X10E3/UL (ref 1.4–7)
NEUTROPHILS NFR BLD AUTO: 57 %
PLATELET # BLD AUTO: 196 X10E3/UL (ref 150–450)
POTASSIUM SERPL-SCNC: 4.1 MMOL/L (ref 3.5–5.2)
PROT SERPL-MCNC: 6.2 G/DL (ref 6–8.5)
PSA FREE MFR SERPL: 32.5 %
PSA FREE SERPL-MCNC: 0.26 NG/ML
PSA SERPL-MCNC: 0.8 NG/ML (ref 0–4)
RBC # BLD AUTO: 5.36 X10E6/UL (ref 4.14–5.8)
SL AMB VLDL CHOLESTEROL CALC: 12 MG/DL (ref 5–40)
SODIUM SERPL-SCNC: 141 MMOL/L (ref 134–144)
TRIGL SERPL-MCNC: 70 MG/DL (ref 0–149)
TSH SERPL DL<=0.005 MIU/L-ACNC: 2.35 UIU/ML (ref 0.45–4.5)
WBC # BLD AUTO: 5.7 X10E3/UL (ref 3.4–10.8)

## 2023-09-07 ENCOUNTER — OFFICE VISIT (OUTPATIENT)
Dept: FAMILY MEDICINE CLINIC | Facility: CLINIC | Age: 63
End: 2023-09-07
Payer: COMMERCIAL

## 2023-09-07 VITALS
WEIGHT: 196 LBS | OXYGEN SATURATION: 97 % | TEMPERATURE: 98.5 F | HEIGHT: 72 IN | HEART RATE: 85 BPM | BODY MASS INDEX: 26.55 KG/M2 | DIASTOLIC BLOOD PRESSURE: 82 MMHG | SYSTOLIC BLOOD PRESSURE: 130 MMHG

## 2023-09-07 DIAGNOSIS — Z00.00 ANNUAL PHYSICAL EXAM: Primary | ICD-10-CM

## 2023-09-07 DIAGNOSIS — I10 ESSENTIAL HYPERTENSION: ICD-10-CM

## 2023-09-07 DIAGNOSIS — E78.2 HYPERLIPIDEMIA, MIXED: ICD-10-CM

## 2023-09-07 PROCEDURE — 99396 PREV VISIT EST AGE 40-64: CPT | Performed by: NURSE PRACTITIONER

## 2023-09-07 NOTE — PROGRESS NOTES
[address] [facility]    NAME: Irina Marlow  AGE: 58 y.o. SEX: male  : 1960     DATE: 2023     Assessment and Plan:     Problem List Items Addressed This Visit        Cardiovascular and Mediastinum    Essential hypertension     BP stable  Continue amlodipine daily            Other    Hyperlipidemia, mixed     Check cholesterol levels  Following with cardiology  Discussed CT calcium score   Has had stress test and echo         BMI 26.0-26.9,adult     See BMI plan          Other Visit Diagnoses     Annual physical exam    -  Primary          Immunizations and preventive care screenings were discussed with patient today. Appropriate education was printed on patient's after visit summary. Discussed risks and benefits of prostate cancer screening. We discussed the controversial history of PSA screening for prostate cancer in the [facility] as well as the risk of over detection and over treatment of prostate cancer by way of PSA screening. The patient understands that PSA blood testing is an imperfect way to screen for prostate cancer and that elevated PSA levels in the blood may also be caused by infection, inflammation, prostatic trauma or manipulation, urological procedures, or by benign prostatic enlargement. The role of the digital rectal examination in prostate cancer screening was also discussed and I discussed with him that there is large interobserver variability in the findings of digital rectal examination. Counseling:  Dental Health: discussed importance of regular tooth brushing, flossing, and dental visits. Injury prevention: discussed safety/seat belts, safety helmets, smoke detectors, carbon dioxide detectors, and smoking near bedding or upholstery.   Sexual health: discussed sexually transmitted diseases, partner selection, use of condoms, avoidance of unintended pregnancy, and contraceptive alternatives. · Exercise: the importance of regular exercise/physical activity was discussed. Recommend exercise 3-5 times per week for at least 30 minutes. BMI Counseling: Body mass index is 26.58 kg/m². The BMI is above normal. Nutrition recommendations include decreasing portion sizes, encouraging healthy choices of fruits and vegetables, limiting drinks that contain sugar, moderation in carbohydrate intake and reducing intake of cholesterol. Exercise recommendations include exercising 3-5 times per week and strength training exercises. Rationale for BMI follow-up plan is due to patient being overweight or obese. Depression Screening and Follow-up Plan: Patient was screened for depression during today's encounter. They screened negative with a PHQ-2 score of 0. No follow-ups on file. Chief Complaint:     Chief Complaint   Patient presents with   • Physical Exam      History of Present Illness:     Adult Annual Physical   Patient here for a comprehensive physical exam. The patient reports no problems. Right inguinal hernia saw dr Lucia Ortiz- may consider surgical repair in January 8/28/23 labs reviewed cholesterol , TG 70, HDL 60, LDL [facility]3] Cardiology  2023 had ECHO  Stress test within last 6 years normal     Colonoscopy 3/2019 - Dr Dunia [facility]- due in 3/2024    Diet and Physical Activity  · Diet/Nutrition: well balanced diet. · Exercise: 5-7 times a week on average. Depression Screening  PHQ-2/9 Depression Screening    Little interest or pleasure in doing things: 0 - not at all  Feeling down, depressed, or hopeless: 0 - not at all  PHQ-2 Score: 0  PHQ-2 Interpretation: Negative depression screen       General Health  · Sleep: sleeps well. · Hearing: normal - bilateral.  · Vision: goes for regular eye exams and wears glasses. · Dental: regular dental visits and brushes teeth twice daily.         Health   · Symptoms include: none Review of Systems:     Review of Systems   Constitutional: Negative. HENT: Negative. Eyes: Negative. Respiratory: Negative. Cardiovascular: Negative. Gastrointestinal: Negative. Endocrine: Negative. Genitourinary: Negative. Musculoskeletal: Positive for arthralgias. Negative for myalgias. Skin: Negative. Allergic/Immunologic: Negative. Neurological: Negative. Hematological: Negative. Psychiatric/Behavioral: Negative.        Past Medical History:     Past Medical History:   Diagnosis Date   • HL (hearing loss)     rining of ears   • Hypertension    • Shingles       Past Surgical History:     Past Surgical History:   Procedure Laterality Date   • VENTRAL HERNIA REPAIR  2001      Family History:     Family History   Problem Relation Age of Onset   • Cancer Mother    • Hypertension Father    • Prostate cancer Father    • Heart attack Father    • Autism Son       Social History:     Social History     Socioeconomic History   • Marital status: /Civil Union     Spouse name: None   • Number of children: None   • Years of education: None   • Highest education level: None   Occupational History   • None   Tobacco Use   • Smoking status: Former   • Smokeless tobacco: Former   Vaping Use   • Vaping Use: Never used   Substance and Sexual Activity   • Alcohol use: Yes     Comment: 16 oz per week    • Drug use: Yes     Comment: 30 years ago    • Sexual activity: None   Other Topics Concern   • None   Social History Narrative   • None     Social Determinants of Health     Financial Resource Strain: Not on file   Food Insecurity: Not on file   Transportation Needs: Not on file   Physical Activity: Not on file   Stress: Not on file   Social Connections: Not on file   Intimate Partner Violence: Not on file   Housing Stability: Not on file      Current Medications:     Current Outpatient Medications   Medication Sig Dispense Refill   • amLODIPine (NORVASC) 10 mg tablet Take 10 mg by mouth daily  3   • Ascorbic Acid (VITAMIN C PO) Take by mouth     • aspirin (ECOTRIN LOW STRENGTH) 81 mg EC tablet take 1 tablet by oral route  every day     • Cholecalciferol (VITAMIN D PO) Take by mouth     • co-enzyme Q-10 30 MG capsule Take 30 mg by mouth 3 (three) times a day     • ibuprofen (MOTRIN) 600 mg tablet      • magnesium 30 MG tablet Take 30 mg by mouth 2 (two) times a day     • Omega-3 Fatty Acids (FISH OIL PO) Take by mouth     • Probiotic Product (PROBIOTIC PO) Take by mouth     • Turmeric, Curcuma Longa, (CURCUMIN) POWD by Does not apply route       No current facility-administered medications for this visit. Allergies:     No Known Allergies   Physical Exam:     /82   Pulse 85   Temp 98.5 °F (36.9 °C) (Tympanic)   Ht 6' (1.829 m)   Wt 88.9 kg (196 lb)   SpO2 97%   BMI 26.58 kg/m²     Physical Exam  Vitals and nursing note reviewed. Constitutional:       Appearance: Normal appearance. He is well-developed and normal weight. Comments: Muscular build, not overweight, minimal body fat     HENT:      Head: Normocephalic and atraumatic. Right Ear: Tympanic membrane and ear canal normal.      Left Ear: Tympanic membrane and ear canal normal.      Nose: Nose normal.      Mouth/Throat:      Mouth: Mucous membranes are moist.      Pharynx: Oropharynx is clear. Eyes:      Conjunctiva/sclera: Conjunctivae normal.      Pupils: Pupils are equal, round, and reactive to light. Cardiovascular:      Rate and Rhythm: Normal rate and regular rhythm. Pulses:           Radial pulses are 2+ on the right side and 2+ on the left side. Heart sounds: Normal heart sounds. No murmur heard. Pulmonary:      Effort: Pulmonary effort is normal. No respiratory distress. Breath sounds: Normal breath sounds. Abdominal:      General: Abdomen is flat. Bowel sounds are normal.      Palpations: Abdomen is soft. Tenderness: There is no abdominal tenderness.    Musculoskeletal: General: Normal range of motion. Cervical back: Neck supple. Right lower leg: No edema. Left lower leg: No edema. Skin:     General: Skin is warm and dry. Capillary Refill: Capillary refill takes less than 2 seconds. Neurological:      Mental Status: He is alert and oriented to person, place, and time. Psychiatric:         Mood and Affect: Mood normal.         Behavior: Behavior normal.         Thought Content:  Thought content normal.         Judgment: Judgment normal.          Jocelyn Garner, [address] [facility]

## 2023-09-11 NOTE — ASSESSMENT & PLAN NOTE
Check cholesterol levels  Following with cardiology  Discussed CT calcium score   Has had stress test and echo

## 2023-09-26 ENCOUNTER — EVALUATION (OUTPATIENT)
Dept: PHYSICAL THERAPY | Facility: CLINIC | Age: 63
End: 2023-09-26
Payer: COMMERCIAL

## 2023-09-26 DIAGNOSIS — M54.2 NECK PAIN: Primary | ICD-10-CM

## 2023-09-26 DIAGNOSIS — M43.6 STIFFNESS OF NECK: ICD-10-CM

## 2023-09-26 DIAGNOSIS — R42 DIZZINESS: ICD-10-CM

## 2023-09-26 PROCEDURE — 97162 PT EVAL MOD COMPLEX 30 MIN: CPT | Performed by: PHYSICAL THERAPIST

## 2023-09-26 PROCEDURE — 97110 THERAPEUTIC EXERCISES: CPT | Performed by: PHYSICAL THERAPIST

## 2023-09-26 NOTE — PROGRESS NOTES
PT Evaluation     Today's date: 2023  Patient name: German Kaur  : 1960  MRN: [id number]  Referring provider: [place], *  Dx:   Encounter Diagnosis     ICD-10-CM    1. Neck pain  M54.2       2. Dizziness  R42       3. Stiffness of neck  M43.6               Assessment  Assessment details: German Kaur is a 58 y.o. male presenting to outpatient physical therapy at [facility] with complaints of recent onset history of dizziness and unsteadiness that began this morning after he got up from performing stretches.  He presents with decreased range of motion, decreased strength, limited flexibility, poor postural awareness, poor body mechanics, altered gait pattern, poor balance, decreased tolerance to activity and decreased functional mobility due to Dizziness  (primary encounter diagnosis), Neck stiffness, Vertigo. Therapist discussed diagnosis, prognosis, plan of care, proper responses to exercises, HEP, and modalities to use at home.  Duran Murrieta would benefit from skilled PT services in order to address these deficits and reach maximum level of function.  Thank you for the referral!    Impairments: abnormal coordination, abnormal gait, abnormal muscle firing, abnormal muscle tone, abnormal or restricted ROM, abnormal movement, activity intolerance, impaired balance, impaired physical strength, lacks appropriate home exercise program, pain with function, scapular dyskinesis, weight-bearing intolerance, poor posture  and poor body mechanics    Symptom irritability: moderateUnderstanding of Dx/Px/POC: good   Prognosis: good    Goals  STGs (in 4 weeks):  1. Pt will report having at least a 50% improvement since I.E.  2. Pt will demonstrate full CROM in all planes without onset of sxs. LTGs (in 12 weeks):  1. Pt will report having at least a 75% improvement since I.E.   2. Pt will be independent with HEP. 3. Pt will deny having sxs with bed/lying mobility.    4. Pt will deny having fogginess during ADLs, exercise, and IADLs. Plan  Patient would benefit from: skilled physical therapy  Planned modality interventions: unattended electrical stimulation and TENS  Planned therapy interventions: joint mobilization, manual therapy, neuromuscular re-education, patient education, self care, strengthening, stretching, therapeutic activities, therapeutic exercise, home exercise program, flexibility and balance  Frequency: 2x week  Plan of Care beginning date: 2023  Plan of Care expiration date: 2023  Treatment plan discussed with: patient        Subjective Evaluation    History of Present Illness  Mechanism of injury: Pt is a 58year old male who presents with AM dizziness and unsteadiness that began this morning after he performed his supine stretching for mid back. He reports that when he sat up he had onset of sxs that seem different compared to the last time. He denies having spinning today. Now referred to skilled OP PT. Quality of life: good    Pain  Current pain rating: 3  At best pain ratin  At worst pain ratin  Quality: dizziness, spinning, fogginess. Relieving factors: relaxation and rest  Exacerbated by: rolling over in bed, supine to sitting, looking up. Progression: worsening    Treatments  Current treatment: massage  Current treatment comments: accupunture .      Patient Goals  Patient goals for therapy: decreased pain, improved balance, increased motion, increased strength, independence with ADLs/IADLs and return to sport/leisure activities          Objective     Observation/Posture: FHP, slight rounded shoulders    Cervical Screen  (*=pain)  (**=symptom provocation)    AROM: (deg)  Flexion: 45  Extension: 58  R Rot: 71  L Rot: 78  R Lat Flex: 28  L Lat Flex: 30    Palpation/Tenderness: TTPr B UT, LS (L worse than R); cervical paraspinals     Joint Mobility:  Decreased PA glides in cervical and thoracic     Strength: (MMT)    R  L  S' Flex  4/5  4/5  S' ABD  4/5  4/5  S' ER  4/5  4/5    UT  TBA  TBA  MT  TBA  TBA  LT  TBA  TBA    Visual-Occular and Vestibular Testing:  Smooth pursuits: negative   Convergence: TBA  Saccades: negative  VORx1: 30 seconds - positive with increased sxs (horiz)  [place]-Hallpike: (R) positive (L) negative     Special Tests  Vertebral Artery: negative     Precautions: standard    HEP: chin tucks, upper trap stretch, levator scap stretch, thoracic foam roller    Specialty Daily Treatment Diary       Manual 2/21       SOR SMF       TPR/STM B UT, LS, cervical paraspinals SMF ea       B UT stretch SMF ea       B LS stretch SMF ea               Exercise Diary         Epley manuever (R) x2               UBE (retro)                TB Scap Retraction -> [place]        TB B S' Ext -> [place]                TB B ER        TB B Horiz ABD                Low pec stretch        Mod pec stretch                Seated Chin tucks        Seated chin tuck to extension        SNAGs Extension NV       SNAGs Rotation        B UT stretch        B LS stretch        Seated Thoracic Extension over half foam                Supine chin tucks  c MH and pillow: 5 sec x 20; table: 5 sec x 10               Sidelying Thoracic Rotation stretch                Prone Y        Prone T        Prone I        Prone W                        Thoracic Extension over foam roller                        HEP/EDU Diagnosis, prognosis, POC, proper responses to exercises, HEP, modalities       Modalities                CP                   Skin checks performed pre and post application: intact

## 2023-09-27 ENCOUNTER — TELEPHONE (OUTPATIENT)
Dept: FAMILY MEDICINE CLINIC | Facility: CLINIC | Age: 63
End: 2023-09-27

## 2024-03-20 LAB
BUN SERPL-MCNC: 16 MG/DL (ref 9–20)
CALCIUM SERPL-MCNC: 9.8 MG/DL (ref 8.4–10.2)
CHLORIDE SERPL-SCNC: 101 MMOL/L (ref 98–107)
CO2 SERPL-SCNC: 29 MMOL/L (ref 22–30)
EGFR: > 60
ERYTHROCYTE [DISTWIDTH] IN BLOOD BY AUTOMATED COUNT: 12.2 % (ref 11.5–14.5)
GLUCOSE SERPL-MCNC: 95 MG/DL (ref 70–99)
HCT VFR BLD AUTO: 48.1 % (ref 39–52)
HGB BLD-MCNC: 16.6 G/DL (ref 13–18)
MCHC RBC AUTO-ENTMCNC: 34.5 G/DL (ref 33–37)
MCV RBC AUTO: 88.3 FL (ref 80–94)
PLATELET # BLD AUTO: 208 10^3/UL (ref 130–400)
POTASSIUM SERPL-SCNC: 4.2 MMOL/L (ref 3.5–5.1)
SODIUM SERPL-SCNC: 139 MMOL/L (ref 135–145)

## 2024-04-01 ENCOUNTER — HOSPITAL ENCOUNTER (OUTPATIENT)
Dept: HOSPITAL 99 - SDS | Age: 64
Discharge: HOME | End: 2024-04-01
Payer: COMMERCIAL

## 2024-04-01 VITALS — DIASTOLIC BLOOD PRESSURE: 83 MMHG | RESPIRATION RATE: 12 BRPM | OXYGEN SATURATION: 2 %

## 2024-04-01 VITALS — DIASTOLIC BLOOD PRESSURE: 95 MMHG | SYSTOLIC BLOOD PRESSURE: 159 MMHG

## 2024-04-01 VITALS — DIASTOLIC BLOOD PRESSURE: 80 MMHG | RESPIRATION RATE: 16 BRPM

## 2024-04-01 VITALS
RESPIRATION RATE: 13 BRPM | SYSTOLIC BLOOD PRESSURE: 136 MMHG | OXYGEN SATURATION: 2 % | DIASTOLIC BLOOD PRESSURE: 95 MMHG

## 2024-04-01 VITALS
SYSTOLIC BLOOD PRESSURE: 125 MMHG | OXYGEN SATURATION: 2 % | DIASTOLIC BLOOD PRESSURE: 83 MMHG | RESPIRATION RATE: 12 BRPM

## 2024-04-01 VITALS — BODY MASS INDEX: 27.8 KG/M2

## 2024-04-01 VITALS — SYSTOLIC BLOOD PRESSURE: 136 MMHG | DIASTOLIC BLOOD PRESSURE: 79 MMHG | RESPIRATION RATE: 16 BRPM

## 2024-04-01 VITALS — DIASTOLIC BLOOD PRESSURE: 81 MMHG | RESPIRATION RATE: 11 BRPM

## 2024-04-01 DIAGNOSIS — K40.90: Primary | ICD-10-CM

## 2024-04-01 PROCEDURE — 49505 PRP I/HERN INIT REDUC >5 YR: CPT

## 2024-04-01 PROCEDURE — C1781 MESH (IMPLANTABLE): HCPCS

## 2024-04-01 RX ADMIN — ACETAMINOPHEN 1000 MG: 500 TABLET ORAL at 06:41

## 2024-04-01 RX ADMIN — SODIUM CHLORIDE, SODIUM ACETATE ANHYDROUS, SODIUM GLUCONATE, POTASSIUM CHLORIDE, AND MAGNESIUM CHLORIDE 1000: 526; 222; 502; 37; 30 INJECTION, SOLUTION INTRAVENOUS at 06:42

## 2024-05-08 ENCOUNTER — TELEPHONE (OUTPATIENT)
Dept: FAMILY MEDICINE CLINIC | Facility: CLINIC | Age: 64
End: 2024-05-08

## 2024-05-08 NOTE — TELEPHONE ENCOUNTER
Insurance: [facility]  Member ID: [id number]      Date of Service: 5/14/2024  Place of Service: Office Visit  Diagnosis Code: I10  Procedure Code: 58054    NPI:[id number]  [facility]

## 2024-05-15 ENCOUNTER — TELEPHONE (OUTPATIENT)
Age: 64
End: 2024-05-15

## 2024-05-15 NOTE — TELEPHONE ENCOUNTER
Patient is requesting an insurance referral for the following specialty:      Test Name / Order Name: Abdominal Aorta US    DX Code: Z87.891    Date Of Service: 5/28/24    Location/Facility Name/Address/Phone #:     Location / Facility NPI: [id number] (bebo npi)  [facility] NPI-[id number]    Best Phone # To Reach The Patient:  [phone]

## 2024-06-07 ENCOUNTER — HOSPITAL ENCOUNTER (OUTPATIENT)
Dept: HOSPITAL 99 - HWRCS | Age: 64
End: 2024-06-07
Payer: COMMERCIAL

## 2024-06-07 DIAGNOSIS — I77.810: Primary | ICD-10-CM

## 2024-06-10 ENCOUNTER — TELEPHONE (OUTPATIENT)
Age: 64
End: 2024-06-10

## 2024-06-10 NOTE — TELEPHONE ENCOUNTER
Patient is requesting an insurance referral for the following specialty:      Test Name / Order Name: Colonoscopy     DX Code: Screening     Date Of Service: 6/24    Location/Facility Name/Address/Phone #:    [facility]   [address] [place] [address]   [phone]    Location / Facility NPI: [id number]

## 2024-06-17 NOTE — TELEPHONE ENCOUNTER
Confirmation - Referral [id number]  Effective: 06/17/2024     Expires: 09/15/2024  Active  Referred From  [facility]  Specialty: [facility]  Group NPI: [id number]  Provider ID: [id number]  Tax ID: [id number]  [address] [place] [address]  Referred To  [facility]  Specialty: Gastroenterology  Group NPI: [id number]  Provider ID: [id number]  Tax ID: [id number]  This referral is valid at any location for the above group  Patient Info  DENNIS VARELA  [id number]  Male  1960  [address] [place] [address]  Clinical Information  Place of Service  Office  Service Type  Medical Care  Diagnosis  1Z86.010 - personal history of colonic polyps  Procedures  199499 - unlisted evaluation and management service

## 2024-06-24 ENCOUNTER — HOSPITAL ENCOUNTER (OUTPATIENT)
Dept: HOSPITAL 99 - GI | Age: 64
End: 2024-06-24
Payer: COMMERCIAL

## 2024-06-24 DIAGNOSIS — Z12.11: Primary | ICD-10-CM

## 2024-06-24 DIAGNOSIS — Z86.010: ICD-10-CM

## 2024-06-24 DIAGNOSIS — Z98.890: ICD-10-CM

## 2024-06-24 DIAGNOSIS — K64.8: ICD-10-CM

## 2024-06-24 DIAGNOSIS — D12.8: ICD-10-CM

## 2024-06-24 DIAGNOSIS — D12.0: ICD-10-CM

## 2024-06-24 DIAGNOSIS — D12.4: ICD-10-CM

## 2024-06-24 DIAGNOSIS — K57.30: ICD-10-CM

## 2024-06-24 PROCEDURE — 88305 TISSUE EXAM BY PATHOLOGIST: CPT

## 2024-06-24 PROCEDURE — 45385 COLONOSCOPY W/LESION REMOVAL: CPT

## 2024-08-15 ENCOUNTER — TELEPHONE (OUTPATIENT)
Age: 64
End: 2024-08-15

## 2024-08-15 NOTE — TELEPHONE ENCOUNTER
Confirmation - Referral [id number]  Effective: 08/15/2024     Expires: 11/13/2024  Active  Referred From  [facility]  Specialty: Continuing Care [facility]term [facility]  Group NPI: [id number]  Provider ID: [id number]  Tax ID: [id number]  [address] [place] [address]  Referred To  PAUL SMITH MD  Specialty: Dermatology  Group NPI: [id number]  Provider ID: [id number]  Tax ID: [id number]  This referral is valid at any location for the above group  Patient Info  DENNIS VARELA  [id number]  Male  1960  [address] [place] [address]  Clinical Information  Place of Service  Office  Service Type  Medical Care  Diagnosis  1R68.89 - other general symptoms and signs  Procedures  199499 - unlisted evaluation and management service

## 2024-08-15 NOTE — TELEPHONE ENCOUNTER
Patient needs amb referral for dermatology and colonoscopy in order for insurance referral to be completed.    Patient is requesting an insurance referral for the following specialty:      Test Name / Order Name: dermatology    DX Code: Skin check    Date Of Service: 08/23/24    Location/Facility Name/Address/Phone #: dr Massey, [address] [place] [address] [phone]    Location / Facility NPI: [id number]    Best Phone # To Reach The Patient:  [phone]  ----------------------------------  Patient is requesting an insurance referral for the following specialty:      Test Name / Order Name: Colonscopy    DX Code: Z86.010    Date Of Service: 08/27/24    Location/Facility Name/Address/Phone #: [facility]               [address] [place] [address]               [phone]      Location / Facility NPI: [id number]    Best Phone # To Reach The Patient:

## 2024-08-15 NOTE — TELEPHONE ENCOUNTER
Confirmation - Referral [id number]  Effective: 08/15/2024     Expires: 11/13/2024  Active  Referred From  [facility]  Specialty: Continuing Care assisted Center  Group NPI: [id number]  Provider ID: [id number]  Tax ID: [id number]  [address] [place] [address]  Referred To  [facility]  Specialty: Gastroenterology  Group NPI: [id number]  Provider ID: [id number]  Tax ID: [id number]  This referral is valid at any location for the above group  Patient Info  DENNIS VARELA  [id number]  Male  1960  [address] [place] [address]  Clinical Information  Place of Service  Office  Service Type  Medical Care  Diagnosis  1Z86.010 - personal history of colonic polyps  Procedures  199499 - unlisted evaluation and management service

## 2024-08-27 ENCOUNTER — HOSPITAL ENCOUNTER (OUTPATIENT)
Dept: HOSPITAL 99 - GI | Age: 64
Discharge: HOME | End: 2024-08-27
Payer: COMMERCIAL

## 2024-08-27 VITALS — BODY MASS INDEX: 26.9 KG/M2

## 2024-08-27 VITALS — DIASTOLIC BLOOD PRESSURE: 87 MMHG | RESPIRATION RATE: 12 BRPM | SYSTOLIC BLOOD PRESSURE: 125 MMHG

## 2024-08-27 VITALS — RESPIRATION RATE: 13 BRPM | SYSTOLIC BLOOD PRESSURE: 116 MMHG | DIASTOLIC BLOOD PRESSURE: 90 MMHG

## 2024-08-27 VITALS — DIASTOLIC BLOOD PRESSURE: 88 MMHG | SYSTOLIC BLOOD PRESSURE: 127 MMHG

## 2024-08-27 VITALS — SYSTOLIC BLOOD PRESSURE: 115 MMHG | DIASTOLIC BLOOD PRESSURE: 83 MMHG

## 2024-08-27 VITALS — DIASTOLIC BLOOD PRESSURE: 92 MMHG

## 2024-08-27 DIAGNOSIS — K64.0: Primary | ICD-10-CM

## 2024-08-27 DIAGNOSIS — K63.89: ICD-10-CM

## 2024-08-27 DIAGNOSIS — D12.6: ICD-10-CM

## 2024-08-27 DIAGNOSIS — D12.0: ICD-10-CM

## 2024-08-27 PROCEDURE — 45380 COLONOSCOPY AND BIOPSY: CPT

## 2024-08-27 PROCEDURE — 45390 COLONOSCOPY W/RESECTION: CPT

## 2024-08-27 PROCEDURE — 88305 TISSUE EXAM BY PATHOLOGIST: CPT

## 2024-12-06 ENCOUNTER — HOSPITAL ENCOUNTER (OUTPATIENT)
Dept: HOSPITAL 99 - RAD | Age: 64
End: 2024-12-06
Payer: COMMERCIAL

## 2024-12-06 DIAGNOSIS — Z87.891: Primary | ICD-10-CM

## 2025-02-03 ENCOUNTER — HOSPITAL ENCOUNTER (OUTPATIENT)
Dept: HOSPITAL 99 - GI | Age: 65
Discharge: HOME | End: 2025-02-03
Payer: COMMERCIAL

## 2025-02-03 DIAGNOSIS — K57.30: ICD-10-CM

## 2025-02-03 DIAGNOSIS — Z86.0101: ICD-10-CM

## 2025-02-03 DIAGNOSIS — Z12.11: Primary | ICD-10-CM
